# Patient Record
Sex: FEMALE | Race: WHITE | NOT HISPANIC OR LATINO | Employment: FULL TIME | URBAN - METROPOLITAN AREA
[De-identification: names, ages, dates, MRNs, and addresses within clinical notes are randomized per-mention and may not be internally consistent; named-entity substitution may affect disease eponyms.]

---

## 2023-03-31 DIAGNOSIS — E05.90 HYPERTHYROIDISM: ICD-10-CM

## 2023-03-31 RX ORDER — LEVOTHYROXINE SODIUM 75 UG/1
75 TABLET ORAL DAILY
Qty: 90 TABLET | Refills: 1 | OUTPATIENT
Start: 2023-03-31

## 2023-07-05 DIAGNOSIS — E05.90 HYPERTHYROIDISM: ICD-10-CM

## 2023-07-06 RX ORDER — LEVOTHYROXINE SODIUM 75 UG/1
75 TABLET ORAL DAILY
Qty: 90 TABLET | Refills: 0 | Status: SHIPPED | OUTPATIENT
Start: 2023-07-06 | End: 2023-07-31 | Stop reason: SDUPTHER

## 2023-07-13 PROBLEM — M81.8 ADULT IDIOPATHIC GENERALIZED OSTEOPOROSIS: Status: ACTIVE | Noted: 2023-07-13

## 2023-07-13 PROBLEM — G62.9 NEUROPATHY: Status: ACTIVE | Noted: 2023-07-13

## 2023-07-13 PROBLEM — E78.2 HYPERLIPIDEMIA, MIXED: Status: ACTIVE | Noted: 2023-07-13

## 2023-07-13 PROBLEM — K21.9 GERD (GASTROESOPHAGEAL REFLUX DISEASE): Status: ACTIVE | Noted: 2023-07-13

## 2023-07-13 PROBLEM — R76.8 ANA POSITIVE: Status: ACTIVE | Noted: 2023-07-13

## 2023-07-13 PROBLEM — E03.9 HYPOTHYROIDISM: Status: ACTIVE | Noted: 2023-07-13

## 2023-07-13 PROBLEM — M51.9 LUMBAR DISC DISEASE: Status: ACTIVE | Noted: 2023-07-13

## 2023-07-13 PROBLEM — R05.8 DRY COUGH: Status: ACTIVE | Noted: 2023-07-13

## 2023-07-13 RX ORDER — ZINC SULFATE 50(220)MG
CAPSULE ORAL
COMMUNITY

## 2023-07-13 RX ORDER — BENZONATATE 200 MG/1
CAPSULE ORAL
COMMUNITY
Start: 2022-12-05 | End: 2024-02-08 | Stop reason: ALTCHOICE

## 2023-07-13 RX ORDER — ELECTROLYTES/DEXTROSE
SOLUTION, ORAL ORAL
COMMUNITY

## 2023-07-13 RX ORDER — MECLIZINE HYDROCHLORIDE 25 MG/1
TABLET ORAL
COMMUNITY
End: 2023-07-31 | Stop reason: SDUPTHER

## 2023-07-13 RX ORDER — GLUC/MSM/COLGN2/HYAL/ANTIARTH3 375-375-20
TABLET ORAL
COMMUNITY

## 2023-07-13 RX ORDER — MULTIVITAMIN
TABLET ORAL
COMMUNITY

## 2023-07-13 RX ORDER — PRAVASTATIN SODIUM 80 MG/1
TABLET ORAL
COMMUNITY
End: 2023-07-31 | Stop reason: SDUPTHER

## 2023-07-31 ENCOUNTER — OFFICE VISIT (OUTPATIENT)
Dept: PRIMARY CARE | Facility: CLINIC | Age: 78
End: 2023-07-31
Payer: COMMERCIAL

## 2023-07-31 VITALS
WEIGHT: 160 LBS | DIASTOLIC BLOOD PRESSURE: 80 MMHG | HEIGHT: 66 IN | BODY MASS INDEX: 25.71 KG/M2 | SYSTOLIC BLOOD PRESSURE: 128 MMHG

## 2023-07-31 DIAGNOSIS — E55.9 VITAMIN D DEFICIENCY: ICD-10-CM

## 2023-07-31 DIAGNOSIS — R42 VERTIGO: ICD-10-CM

## 2023-07-31 DIAGNOSIS — Z12.31 VISIT FOR SCREENING MAMMOGRAM: ICD-10-CM

## 2023-07-31 DIAGNOSIS — E03.8 HYPOTHYROIDISM DUE TO HASHIMOTO'S THYROIDITIS: ICD-10-CM

## 2023-07-31 DIAGNOSIS — E78.2 HYPERLIPIDEMIA, MIXED: ICD-10-CM

## 2023-07-31 DIAGNOSIS — E06.3 HYPOTHYROIDISM DUE TO HASHIMOTO'S THYROIDITIS: ICD-10-CM

## 2023-07-31 DIAGNOSIS — Z00.00 ROUTINE GENERAL MEDICAL EXAMINATION AT A HEALTH CARE FACILITY: Primary | ICD-10-CM

## 2023-07-31 DIAGNOSIS — Z78.0 POSTMENOPAUSAL: ICD-10-CM

## 2023-07-31 DIAGNOSIS — E05.90 HYPERTHYROIDISM: ICD-10-CM

## 2023-07-31 PROBLEM — E61.1 IRON DEFICIENCY: Status: ACTIVE | Noted: 2023-07-31

## 2023-07-31 PROBLEM — M19.90 ARTHRITIS: Status: ACTIVE | Noted: 2023-07-31

## 2023-07-31 LAB
ALANINE AMINOTRANSFERASE (SGPT) (U/L) IN SER/PLAS: 15 U/L (ref 7–45)
ALBUMIN (G/DL) IN SER/PLAS: 4.4 G/DL (ref 3.4–5)
ALKALINE PHOSPHATASE (U/L) IN SER/PLAS: 83 U/L (ref 33–136)
ANION GAP IN SER/PLAS: 11 MMOL/L (ref 10–20)
ASPARTATE AMINOTRANSFERASE (SGOT) (U/L) IN SER/PLAS: 17 U/L (ref 9–39)
BILIRUBIN TOTAL (MG/DL) IN SER/PLAS: 0.5 MG/DL (ref 0–1.2)
CALCIDIOL (25 OH VITAMIN D3) (NG/ML) IN SER/PLAS: 18 NG/ML
CALCIUM (MG/DL) IN SER/PLAS: 9.5 MG/DL (ref 8.6–10.6)
CARBON DIOXIDE, TOTAL (MMOL/L) IN SER/PLAS: 27 MMOL/L (ref 21–32)
CHLORIDE (MMOL/L) IN SER/PLAS: 107 MMOL/L (ref 98–107)
CHOLESTEROL (MG/DL) IN SER/PLAS: 216 MG/DL (ref 0–199)
CHOLESTEROL IN HDL (MG/DL) IN SER/PLAS: 42.5 MG/DL
CHOLESTEROL/HDL RATIO: 5.1
COBALAMIN (VITAMIN B12) (PG/ML) IN SER/PLAS: 327 PG/ML (ref 211–911)
CREATININE (MG/DL) IN SER/PLAS: 0.8 MG/DL (ref 0.5–1.05)
ERYTHROCYTE DISTRIBUTION WIDTH (RATIO) BY AUTOMATED COUNT: 13.6 % (ref 11.5–14.5)
ERYTHROCYTE MEAN CORPUSCULAR HEMOGLOBIN CONCENTRATION (G/DL) BY AUTOMATED: 31 G/DL (ref 32–36)
ERYTHROCYTE MEAN CORPUSCULAR VOLUME (FL) BY AUTOMATED COUNT: 92 FL (ref 80–100)
ERYTHROCYTES (10*6/UL) IN BLOOD BY AUTOMATED COUNT: 4.58 X10E12/L (ref 4–5.2)
GFR FEMALE: 76 ML/MIN/1.73M2
GLUCOSE (MG/DL) IN SER/PLAS: 98 MG/DL (ref 74–99)
HEMATOCRIT (%) IN BLOOD BY AUTOMATED COUNT: 42.2 % (ref 36–46)
HEMOGLOBIN (G/DL) IN BLOOD: 13.1 G/DL (ref 12–16)
LDL: 149 MG/DL (ref 0–99)
LEUKOCYTES (10*3/UL) IN BLOOD BY AUTOMATED COUNT: 4.8 X10E9/L (ref 4.4–11.3)
NRBC (PER 100 WBCS) BY AUTOMATED COUNT: 0 /100 WBC (ref 0–0)
PLATELETS (10*3/UL) IN BLOOD AUTOMATED COUNT: 256 X10E9/L (ref 150–450)
POTASSIUM (MMOL/L) IN SER/PLAS: 4.1 MMOL/L (ref 3.5–5.3)
PROTEIN TOTAL: 7.6 G/DL (ref 6.4–8.2)
SODIUM (MMOL/L) IN SER/PLAS: 141 MMOL/L (ref 136–145)
THYROTROPIN (MIU/L) IN SER/PLAS BY DETECTION LIMIT <= 0.05 MIU/L: 2.17 MIU/L (ref 0.44–3.98)
THYROXINE (T4) FREE (NG/DL) IN SER/PLAS: 1.4 NG/DL (ref 0.78–1.48)
TRIGLYCERIDE (MG/DL) IN SER/PLAS: 122 MG/DL (ref 0–149)
UREA NITROGEN (MG/DL) IN SER/PLAS: 14 MG/DL (ref 6–23)
VLDL: 24 MG/DL (ref 0–40)

## 2023-07-31 PROCEDURE — 85027 COMPLETE CBC AUTOMATED: CPT

## 2023-07-31 PROCEDURE — 1126F AMNT PAIN NOTED NONE PRSNT: CPT | Performed by: FAMILY MEDICINE

## 2023-07-31 PROCEDURE — 1159F MED LIST DOCD IN RCRD: CPT | Performed by: FAMILY MEDICINE

## 2023-07-31 PROCEDURE — 82607 VITAMIN B-12: CPT

## 2023-07-31 PROCEDURE — 99397 PER PM REEVAL EST PAT 65+ YR: CPT | Performed by: FAMILY MEDICINE

## 2023-07-31 PROCEDURE — 1036F TOBACCO NON-USER: CPT | Performed by: FAMILY MEDICINE

## 2023-07-31 PROCEDURE — 84443 ASSAY THYROID STIM HORMONE: CPT

## 2023-07-31 PROCEDURE — 80061 LIPID PANEL: CPT

## 2023-07-31 PROCEDURE — 82306 VITAMIN D 25 HYDROXY: CPT

## 2023-07-31 PROCEDURE — 80053 COMPREHEN METABOLIC PANEL: CPT

## 2023-07-31 PROCEDURE — 84439 ASSAY OF FREE THYROXINE: CPT

## 2023-07-31 RX ORDER — PNEUMOCOCCAL 20-VALENT CONJUGATE VACCINE 2.2; 2.2; 2.2; 2.2; 2.2; 2.2; 2.2; 2.2; 2.2; 2.2; 2.2; 2.2; 2.2; 2.2; 2.2; 2.2; 4.4; 2.2; 2.2; 2.2 UG/.5ML; UG/.5ML; UG/.5ML; UG/.5ML; UG/.5ML; UG/.5ML; UG/.5ML; UG/.5ML; UG/.5ML; UG/.5ML; UG/.5ML; UG/.5ML; UG/.5ML; UG/.5ML; UG/.5ML; UG/.5ML; UG/.5ML; UG/.5ML; UG/.5ML; UG/.5ML
0.5 INJECTION, SUSPENSION INTRAMUSCULAR ONCE
Qty: 0.5 ML | Refills: 0 | Status: SHIPPED | OUTPATIENT
Start: 2023-07-31 | End: 2023-07-31

## 2023-07-31 RX ORDER — MECLIZINE HYDROCHLORIDE 25 MG/1
TABLET ORAL
Qty: 45 TABLET | Refills: 1 | Status: SHIPPED | OUTPATIENT
Start: 2023-07-31

## 2023-07-31 RX ORDER — PRAVASTATIN SODIUM 80 MG/1
TABLET ORAL
Qty: 90 TABLET | Refills: 3 | Status: SHIPPED | OUTPATIENT
Start: 2023-07-31

## 2023-07-31 RX ORDER — LEVOTHYROXINE SODIUM 75 UG/1
75 TABLET ORAL DAILY
Qty: 90 TABLET | Refills: 3 | Status: SHIPPED | OUTPATIENT
Start: 2023-07-31

## 2023-07-31 RX ORDER — DEXAMETHASONE SODIUM PHOSPHATE 1 MG/ML
SOLUTION/ DROPS OPHTHALMIC
COMMUNITY
Start: 2023-04-26 | End: 2023-11-16 | Stop reason: ALTCHOICE

## 2023-07-31 RX ORDER — ERGOCALCIFEROL 1.25 MG/1
1 CAPSULE ORAL
COMMUNITY
Start: 2017-01-10

## 2023-07-31 ASSESSMENT — PATIENT HEALTH QUESTIONNAIRE - PHQ9
2. FEELING DOWN, DEPRESSED OR HOPELESS: NOT AT ALL
SUM OF ALL RESPONSES TO PHQ9 QUESTIONS 1 AND 2: 0
1. LITTLE INTEREST OR PLEASURE IN DOING THINGS: NOT AT ALL

## 2023-08-01 DIAGNOSIS — E78.2 HYPERLIPIDEMIA, MIXED: ICD-10-CM

## 2023-08-01 DIAGNOSIS — E55.9 VITAMIN D DEFICIENCY: ICD-10-CM

## 2023-08-01 RX ORDER — EZETIMIBE 10 MG/1
10 TABLET ORAL DAILY
Qty: 90 TABLET | Refills: 1 | Status: SHIPPED | OUTPATIENT
Start: 2023-08-01 | End: 2024-01-28

## 2023-08-01 RX ORDER — ACETAMINOPHEN 500 MG
5000 TABLET ORAL DAILY
Qty: 90 TABLET | Refills: 1 | Status: SHIPPED | OUTPATIENT
Start: 2023-08-01

## 2023-08-06 PROBLEM — E05.90 HYPERTHYROIDISM: Status: ACTIVE | Noted: 2023-08-06

## 2023-08-06 PROBLEM — Z00.00 ROUTINE GENERAL MEDICAL EXAMINATION AT A HEALTH CARE FACILITY: Status: ACTIVE | Noted: 2023-08-06

## 2023-08-06 PROBLEM — Z78.0 POSTMENOPAUSAL: Status: ACTIVE | Noted: 2023-08-06

## 2023-08-06 PROBLEM — Z12.31 VISIT FOR SCREENING MAMMOGRAM: Status: ACTIVE | Noted: 2023-08-06

## 2023-08-06 NOTE — PROGRESS NOTES
"  Subjective     Patient ID: Denisse Butler is a 78 y.o. female who presents for Annual Exam.      Last Physical :  1 Years ago     Pt's PMH, PSH, SH, FH , meds and allergies was obtained / reviewed and updated .     Dental visits : Y  Vision issues : N  Hearing issues : N    Immunizations : Y    Diet :  could be better  Exercise:  Weight concerns :     Alcohol: as noted in SH  Tobacco: as noted in SH  Recreational drug use : None/ as noted in SH    Sexually active : Active   Contraception :   Menstrual problems:  Premenopausal/perimenopausal/ postmenopausal:    G:  Parity:  Full term:    Premature:   (s):   Living :  Ab induced:   Ab spontaneous :  Ectopic :   Multiple :    PAP smear :  Mammogram :  Colonoscopy:    Metabolic screening   - Lipid   - Glucose  ======================================================    Visit Vitals  Blood Pressure 128/80   Height 1.676 m (5' 6\")   Weight 72.6 kg (160 lb)   Body Mass Index 25.82 kg/m²   Smoking Status Never   Body Surface Area 1.84 m²      No LMP recorded.     =====================================    Review of systems:  Constitutional: no chills, no fever and no night sweats.     Eyes: no blurred vision and no eyesight problems.     ENT: no hearing loss, no nasal congestion, no nasal discharge, no hoarseness and no sore throat.     Cardiovascular: no chest pain, no intermittent leg claudication, no lower extremity edema, no palpitations and no syncope.     Respiratory: no cough, no shortness of breath during exertion, no shortness of breath at rest and no wheezing.     Gastrointestinal: no abdominal pain, no blood in stools, no constipation, no diarrhea, no melena, no nausea, no rectal pain and no vomiting.     Genitourinary: no dysuria, no change in urinary frequency, no urinary hesitancy, no feelings of urinary urgency and no vaginal discharge.     Musculoskeletal: no arthralgias, no back pain and no myalgias.     Integumentary: no new skin lesions and no " rashes.     Neurological: no difficulty walking, no headache, no limb weakness, no numbness and no tingling.     Psychiatric: no anxiety, no depression, no anhedonia and no substance use disorders.   ============================================================    Physical exam :    Constitutional: Alert and in no acute distress. Well developed, well nourished.     Eyes: Normal external exam. Pupils were equal in size, round, reactive to light (PERRL) with normal accommodation and extraocular movements intact (EOMI).     Ears, Nose, Mouth, and Throat: External inspection of ears and nose: Normal.  Otoscopic examination: Normal.      Neck: No neck mass was observed. Supple.     Cardiovascular: Heart rate and rhythm were normal, normal S1 and S2, no gallops, no murmurs and no pericardial rub    Pulmonary: No respiratory distress. Clear bilateral breath sounds.     Abdomen: Soft nontender; no abdominal mass palpated. No organomegaly.     Musculoskeletal: No joint swelling seen, normal movements of all extremities. Range of motion: Normal.  Muscle strength/tone: Normal.      Skin: Normal skin color and pigmentation, normal skin turgor, and no rash.     Neurologic: Deep tendon reflexes were 2+ and symmetric. Sensation: Normal.     Psychiatric: Judgment and insight: Intact. Mood and affect: Normal.    Lymphatic : Cervical/ axillary/ groin Lns Palpable/ non palpable     Endocrine: no recent weight gain and no recent weight loss.     Hematologic/Lymphatic: no tendency for easy bruising and no swollen glands.          All other systems have been reviewed and are negative for complaint.      Assessment/Plan    Problem List Items Addressed This Visit       Hyperlipidemia, mixed    Relevant Medications    pravastatin (Pravachol) 80 mg tablet    Other Relevant Orders    Lipid Panel (Completed)    Comprehensive Metabolic Panel (Completed)    Hypothyroidism    Relevant Orders    Thyroxine, Free (Completed)    Thyroid Stimulating  Hormone (Completed)    XR DEXA bone density    Vertigo    Relevant Medications    meclizine (Antivert) 25 mg tablet    Vitamin D deficiency    Relevant Orders    CBC (Completed)    Vitamin B12 (Completed)    Vitamin D, Total (Completed)    Visit for screening mammogram    Relevant Orders    BI mammo bilateral screening tomosynthesis    Postmenopausal    Relevant Orders    XR DEXA bone density    Routine general medical examination at a health care facility - Primary     -Nutrition: Stressed importance of moderation in sodium/caffeine intake, saturated fat and cholesterol, caloric balance, sufficient intake of fresh fruits, vegetables, fiber, ---Exercise: Stressed the importance of regular exercise.   --Injury prevention: Discussed safety belts, safety helmets, smoke detector, smoking near bedding or upholstery.   --Dental health: Discussed importance of regular tooth brushing, flossing, and dental visits.  --Immunizations reviewed.  --Discussed benefits of screening colonoscopy.  --After hours service discussed with patient           Hyperthyroidism    Relevant Medications    levothyroxine (Synthroid, Levoxyl) 75 mcg tablet

## 2023-10-20 ENCOUNTER — LAB (OUTPATIENT)
Dept: LAB | Facility: LAB | Age: 78
End: 2023-10-20
Payer: COMMERCIAL

## 2023-10-20 ENCOUNTER — OFFICE VISIT (OUTPATIENT)
Dept: NEUROLOGY | Facility: CLINIC | Age: 78
End: 2023-10-20
Payer: COMMERCIAL

## 2023-10-20 VITALS
HEART RATE: 88 BPM | TEMPERATURE: 97.1 F | DIASTOLIC BLOOD PRESSURE: 76 MMHG | SYSTOLIC BLOOD PRESSURE: 137 MMHG | HEIGHT: 64 IN | WEIGHT: 168 LBS | BODY MASS INDEX: 28.68 KG/M2

## 2023-10-20 DIAGNOSIS — G60.9 IDIOPATHIC PERIPHERAL NEUROPATHY: ICD-10-CM

## 2023-10-20 DIAGNOSIS — G60.9 IDIOPATHIC PERIPHERAL NEUROPATHY: Primary | ICD-10-CM

## 2023-10-20 PROCEDURE — 1159F MED LIST DOCD IN RCRD: CPT | Performed by: PSYCHIATRY & NEUROLOGY

## 2023-10-20 PROCEDURE — 99204 OFFICE O/P NEW MOD 45 MIN: CPT | Performed by: PSYCHIATRY & NEUROLOGY

## 2023-10-20 PROCEDURE — 1036F TOBACCO NON-USER: CPT | Performed by: PSYCHIATRY & NEUROLOGY

## 2023-10-20 PROCEDURE — 1126F AMNT PAIN NOTED NONE PRSNT: CPT | Performed by: PSYCHIATRY & NEUROLOGY

## 2023-10-20 PROCEDURE — 36415 COLL VENOUS BLD VENIPUNCTURE: CPT

## 2023-10-20 RX ORDER — NORTRIPTYLINE HYDROCHLORIDE 25 MG/1
CAPSULE ORAL
Qty: 60 CAPSULE | Refills: 3 | Status: SHIPPED | OUTPATIENT
Start: 2023-10-20 | End: 2024-03-23

## 2023-10-20 NOTE — PROGRESS NOTES
Subjective     Denisse Butler is a 78 y.o. year old, right-handed female referred by Dr. Scott ([facility]) for neuropathy    HPI  She had tingling paresthesias of the feet over the last year.  She saw Dr. Scott, who felt she had neuropathy and referred her a year ago.  She delayed coming until she developed mostly nocturnal burning dysesthesias of both feet in the last three months.  She denies other focal neurological symptoms including dysarthria, dysphagia, diplopia, focal weakness, other focal sensory change, ataxia, vertigo, or bowel/bladder incontinence, among others, in the context of the tingling.    Review of Systems   All other systems reviewed and are negative.      Patient Active Problem List   Diagnosis    Adult idiopathic generalized osteoporosis    JENNA positive    Dry cough    GERD (gastroesophageal reflux disease)    Hyperlipidemia, mixed    Hypothyroidism    Lumbar disc disease    Neuropathy    Elevated serum cholesterol    Arthritis    Iron deficiency    Vertigo    Vitamin D deficiency    Visit for screening mammogram    Postmenopausal    Routine general medical examination at a health care facility    Hyperthyroidism     Past Medical History:   Diagnosis Date    Procedure and treatment not carried out because of patient's decision for unspecified reasons 04/02/2015    Colonoscopy refused     Past Surgical History:   Procedure Laterality Date    OTHER SURGICAL HISTORY  03/04/2020    Surgery    OTHER SURGICAL HISTORY  03/04/2020    Lumpectomy     Social History     Tobacco Use    Smoking status: Never    Smokeless tobacco: Never   Substance Use Topics    Alcohol use: Never     family history includes No Known Problems in her father and mother.    Current Outpatient Medications:     benzonatate (Tessalon) 200 mg capsule, TAKE 1 CAPSULE 3 TIMES DAILY AS NEEDED., Disp: , Rfl:     biotin 5 mg capsule, Take daily, Disp: , Rfl:     cholecalciferol (Vitamin D3) 5,000 Units tablet, Take 1 tablet (5,000 Units)  "by mouth once daily., Disp: 90 tablet, Rfl: 1    dexAMETHasone (Decadron) 0.1 % ophthalmic solution, 1 drop in both eyes 4 times daily.  Taper as directed, Disp: , Rfl:     ergocalciferol (Vitamin D-2) 1.25 MG (75315 UT) capsule, 1 capsule (1,250 mcg)., Disp: , Rfl:     ezetimibe (Zetia) 10 mg tablet, Take 1 tablet (10 mg) by mouth once daily., Disp: 90 tablet, Rfl: 1    ferrous gluconate 236 mg (27 mg iron) tablet, Take daily, Disp: , Rfl:     levothyroxine (Synthroid, Levoxyl) 75 mcg tablet, Take 1 tablet (75 mcg) by mouth once daily., Disp: 90 tablet, Rfl: 3    meclizine (Antivert) 25 mg tablet, TAKE 1 TABLET 3 TIMES DAILY AS NEEDED., Disp: 45 tablet, Rfl: 1    multivitamin (Daily Multi-Vitamin) tablet, Take daily, Disp: , Rfl:     pravastatin (Pravachol) 80 mg tablet, TAKE 1 TABLET EVERY DAY (STOP SIMVASTATIN), Disp: 90 tablet, Rfl: 3    zinc sulfate (Zincate) 220 (50 Zn) MG capsule, Take daily, Disp: , Rfl:   Allergies   Allergen Reactions    Oxycodone-Acetaminophen Unknown    Prednisone Unknown       Objective     /76   Pulse 88   Temp 36.2 °C (97.1 °F)   Ht 1.626 m (5' 4\")   Wt 76.2 kg (168 lb)   BMI 28.84 kg/m²     CONSTITUTIONAL:  No acute distress    CARDIOVASCULAR:  Normal pulses in the distal legs, no edema of either arm or either leg.  No carotid bruits.    MENTAL STATUS:  Awake, alert, fully oriented to self, place, and time, with present short-term memory, good awareness of recent events, normal attention span, concentration, and fund of knowledge.    SPEECH AND LANGUAGE:  Can name and repeat, follows all commands, has no dysarthria    FUNDOSCOPIC:  No papilledema    CRANIAL NERVES:  II-Vision present, visual fields full to confrontational testing    III/IV/VI--EOMs are present in all directions.  Pupils are symmetrically reactive in dim light.  No ptosis.    V--Normal facial sensation.    VII--No facial asymmetry.    VIII--Hearing present to voice bilaterally.    IX/X--Symmetric soft " palate rise.    XI--Normal trapezius power bilaterally.    XII--Tongue protrudes without deviation.    MOTOR:  Normal power, tone, and bulk in both arms and both legs.    SENSORY:  Reduced pin sensation in a stocking-glove distribution from the feet to the upper shins and from the fingers to the distal hand.  No asymmetry or spinal sensory level.    COORDINATION:  Normal finger-to-nose and heel-to-shin testing in both arms and both legs.    REFLEXES are normal and symmetric at the biceps, triceps, brachioradialis, patella, and ankle.  The plantar responses are flexor.    GAIT is normal, without steppage, ataxia, shuffling, or spasticity.      Assessment/Plan   Diagnoses and all orders for this visit:  Idiopathic peripheral neuropathy  -     Heavy Metals, Blood; Future  -     nortriptyline (Pamelor) 25 mg capsule; 1 by mouth at bedtime for two weeks, then 2 at bedtime    IMPRESSION:  Mild sensory peripheral neuropathy.    PLAN:  She has already had a lab workup for neuropathy that was negative other than positive JENNA.  I added a heavy metal screen.  In the meantime, I started her on nortriptyline titration to 50 mg at bedtime for prophylaxis of the burning.  I will see her again in three months or prn.    Clarke Alatorre Jr., M.D., FAAN

## 2023-10-25 ENCOUNTER — TELEPHONE (OUTPATIENT)
Dept: NEUROLOGY | Facility: CLINIC | Age: 78
End: 2023-10-25

## 2023-10-25 ENCOUNTER — LAB (OUTPATIENT)
Dept: LAB | Facility: LAB | Age: 78
End: 2023-10-25
Payer: COMMERCIAL

## 2023-10-25 DIAGNOSIS — G62.9 NEUROPATHY: Primary | ICD-10-CM

## 2023-10-25 NOTE — TELEPHONE ENCOUNTER
please re enter lab orders, pt went to lab today to have redrawn and order wasnt entered - thank you

## 2023-10-26 ENCOUNTER — LAB (OUTPATIENT)
Dept: LAB | Facility: LAB | Age: 78
End: 2023-10-26
Payer: COMMERCIAL

## 2023-10-26 DIAGNOSIS — G62.9 NEUROPATHY: ICD-10-CM

## 2023-10-26 PROCEDURE — 83825 ASSAY OF MERCURY: CPT

## 2023-10-26 PROCEDURE — 36415 COLL VENOUS BLD VENIPUNCTURE: CPT

## 2023-10-30 ENCOUNTER — PATIENT MESSAGE (OUTPATIENT)
Dept: NEUROLOGY | Facility: CLINIC | Age: 78
End: 2023-10-30
Payer: COMMERCIAL

## 2023-10-30 LAB
ARSENIC BLD-MCNC: <10 UG/L
LEAD BLDV-MCNC: <2 UG/DL
MERCURY BLD-MCNC: <2.5 UG/L

## 2023-11-06 ENCOUNTER — ANCILLARY PROCEDURE (OUTPATIENT)
Dept: RADIOLOGY | Facility: CLINIC | Age: 78
End: 2023-11-06
Payer: COMMERCIAL

## 2023-11-06 DIAGNOSIS — E03.8 OTHER SPECIFIED HYPOTHYROIDISM: ICD-10-CM

## 2023-11-06 DIAGNOSIS — Z78.0 ASYMPTOMATIC MENOPAUSAL STATE: ICD-10-CM

## 2023-11-06 DIAGNOSIS — E06.3 AUTOIMMUNE THYROIDITIS: ICD-10-CM

## 2023-11-06 PROCEDURE — 77080 DXA BONE DENSITY AXIAL: CPT

## 2023-11-06 PROCEDURE — 77080 DXA BONE DENSITY AXIAL: CPT | Performed by: RADIOLOGY

## 2023-11-16 ENCOUNTER — OFFICE VISIT (OUTPATIENT)
Dept: PRIMARY CARE | Facility: CLINIC | Age: 78
End: 2023-11-16
Payer: COMMERCIAL

## 2023-11-16 VITALS
SYSTOLIC BLOOD PRESSURE: 143 MMHG | HEIGHT: 64 IN | BODY MASS INDEX: 27.31 KG/M2 | WEIGHT: 160 LBS | DIASTOLIC BLOOD PRESSURE: 84 MMHG | HEART RATE: 96 BPM

## 2023-11-16 DIAGNOSIS — J01.00 ACUTE NON-RECURRENT MAXILLARY SINUSITIS: Primary | ICD-10-CM

## 2023-11-16 DIAGNOSIS — G62.9 NEUROPATHY: ICD-10-CM

## 2023-11-16 PROCEDURE — 99214 OFFICE O/P EST MOD 30 MIN: CPT | Performed by: FAMILY MEDICINE

## 2023-11-16 PROCEDURE — 1126F AMNT PAIN NOTED NONE PRSNT: CPT | Performed by: FAMILY MEDICINE

## 2023-11-16 PROCEDURE — 1159F MED LIST DOCD IN RCRD: CPT | Performed by: FAMILY MEDICINE

## 2023-11-16 PROCEDURE — 1036F TOBACCO NON-USER: CPT | Performed by: FAMILY MEDICINE

## 2023-11-16 RX ORDER — AMOXICILLIN AND CLAVULANATE POTASSIUM 875; 125 MG/1; MG/1
875 TABLET, FILM COATED ORAL 2 TIMES DAILY
Qty: 14 TABLET | Refills: 0 | Status: SHIPPED | OUTPATIENT
Start: 2023-11-16 | End: 2023-11-23

## 2023-11-16 ASSESSMENT — ENCOUNTER SYMPTOMS
DEPRESSION: 0
LOSS OF SENSATION IN FEET: 0
OCCASIONAL FEELINGS OF UNSTEADINESS: 0

## 2023-11-16 ASSESSMENT — PATIENT HEALTH QUESTIONNAIRE - PHQ9
1. LITTLE INTEREST OR PLEASURE IN DOING THINGS: NOT AT ALL
2. FEELING DOWN, DEPRESSED OR HOPELESS: NOT AT ALL
SUM OF ALL RESPONSES TO PHQ9 QUESTIONS 1 AND 2: 0

## 2023-11-18 PROBLEM — J01.00 ACUTE NON-RECURRENT MAXILLARY SINUSITIS: Status: ACTIVE | Noted: 2023-11-18

## 2023-11-18 ASSESSMENT — ENCOUNTER SYMPTOMS
ARTHRALGIAS: 1
NUMBNESS: 1
SORE THROAT: 1
GASTROINTESTINAL NEGATIVE: 1
COUGH: 1
SINUS PRESSURE: 1
SINUS PAIN: 1
EYES NEGATIVE: 1
CONSTITUTIONAL NEGATIVE: 1
FACIAL SWELLING: 1

## 2023-11-18 NOTE — PROGRESS NOTES
Subjective   Patient ID: Denisse Butler is a 78 y.o. female who presents for Cough.      Cough  Associated symptoms include a sore throat.    patient is here for cough congestion postnasal drainage and facial pressure denies shortness of breath taking Synthroid as prescribed saw neurology was given amitriptyline has been taking 50 mg but has a very dry mouth  Current Outpatient Medications on File Prior to Visit   Medication Sig Dispense Refill    benzonatate (Tessalon) 200 mg capsule TAKE 1 CAPSULE 3 TIMES DAILY AS NEEDED.      biotin 5 mg capsule Take daily      cholecalciferol (Vitamin D3) 5,000 Units tablet Take 1 tablet (5,000 Units) by mouth once daily. 90 tablet 1    ergocalciferol (Vitamin D-2) 1.25 MG (89829 UT) capsule 1 capsule (1,250 mcg).      ezetimibe (Zetia) 10 mg tablet Take 1 tablet (10 mg) by mouth once daily. 90 tablet 1    ferrous gluconate 236 mg (27 mg iron) tablet Take daily      levothyroxine (Synthroid, Levoxyl) 75 mcg tablet Take 1 tablet (75 mcg) by mouth once daily. 90 tablet 3    meclizine (Antivert) 25 mg tablet TAKE 1 TABLET 3 TIMES DAILY AS NEEDED. 45 tablet 1    multivitamin (Daily Multi-Vitamin) tablet Take daily      nortriptyline (Pamelor) 25 mg capsule 1 by mouth at bedtime for two weeks, then 2 at bedtime 60 capsule 3    pravastatin (Pravachol) 80 mg tablet TAKE 1 TABLET EVERY DAY (STOP SIMVASTATIN) 90 tablet 3    zinc sulfate (Zincate) 220 (50 Zn) MG capsule Take daily      [DISCONTINUED] dexAMETHasone (Decadron) 0.1 % ophthalmic solution 1 drop in both eyes 4 times daily.  Taper as directed       No current facility-administered medications on file prior to visit.        Review of Systems   Constitutional: Negative.    HENT:  Positive for congestion, facial swelling, sinus pressure, sinus pain and sore throat.    Eyes: Negative.    Respiratory:  Positive for cough.    Gastrointestinal: Negative.    Musculoskeletal:  Positive for arthralgias.   Neurological:  Positive for  "numbness.       Objective   Blood Pressure 143/84   Pulse 96   Height 1.626 m (5' 4\")   Weight 72.6 kg (160 lb)   Body Mass Index 27.46 kg/m²   BSA: 1.81 meters squared  Growth percentiles: Facility age limit for growth %catrachito is 20 years. Facility age limit for growth %catrachito is 20 years.   No visits with results within 1 Week(s) from this visit.   Latest known visit with results is:   Lab on 10/26/2023   Component Date Value Ref Range Status    Arsenic 10/26/2023 <10.0  <=12.0 ug/L Final    INTERPRETIVE INFORMATION: Arsenic, Blood    Elevated results may be due to skin or collection-related   contamination, including the use of a noncertified metal-free   collection/transport tube. If contamination concerns exist due to   elevated levels of blood arsenic, confirmation with a second   specimen collected in a certified metal-free tube is recommended.     Potentially toxic ranges for blood arsenic: Greater than or equal   to 600 ug/L.    Blood arsenic is for the detection of recent exposure poisoning   only. Blood arsenic levels in healthy subjects vary considerably   with exposure to arsenic in the diet and the environment. A   24-hour urine arsenic is useful for the detection of chronic   exposure.     This test was developed and its performance characteristics   determined by [facility]. It has not been cleared or   approved by the [place] Food and Drug Administration. This test was   performed in a CLIA certified laboratory and is intended for   clinical purposes.    Mercury 10/26/2023 <2.5  <=10.0 ug/L Final    Comment: INTERPRETIVE INFORMATION: Mercury, Blood    Elevated results may be due to skin or collection-related   contamination, including the use of a noncertified metal-free   collection/transport tube. If contamination concerns exist due to   elevated levels of blood mercury, confirmation with a second   specimen collected in a certified metal-free tube is recommended.    Blood mercury levels " "predominantly reflect recent exposure and are   most useful in the diagnosis of acute poisoning as blood mercury   concentrations rise sharply and fall quickly over several days   after ingestion. Blood concentrations in unexposed individuals   rarely exceed 20 ug/L. The provided reference interval relates to   inorganic mercury concentrations. Dietary and non-occupational   exposure to organic mercury forms may contribute to an elevated   total mercury result. Clinical presentation after toxic exposure   to organic mercury may include dysarthria, ataxia and constricted   vision fields with mercury blood                            concentrations from 20 to 50   ug/L.     This test was developed and its performance characteristics   determined by [facility]. It has not been cleared or   approved by the [place] Food and Drug Administration. This test was   performed in a CLIA certified laboratory and is intended for   clinical purposes.  Performed By: [facility]  [address] [place] [address]  : Sergio Martinez MD, PhD  IA Number: [id number]    Lead, Blood 10/26/2023 <2.0  <=4.9 ug/dL Final    Comment: INTERPRETIVE INFORMATION: Lead, Blood (Venous)    Analysis performed by Inductively Coupled Plasma-Mass Spectrometry   (ICP-MS).    Elevated results may be due to skin or collection-related   contamination, including the use of a noncertified lead-free tube.   If contamination concerns exist due to elevated levels of blood   lead, confirmation with a second specimen collected in a certified   lead-free tube is recommended.    Information sources for blood lead reference intervals and   interpretive comments include the CDC's \"Childhood Lead Poisoning   Prevention: Recommended Actions Based on Blood Lead Level\" and the   \"Adult Blood Lead Epidemiology and Surveillance: Reference Blood   Lead Levels (BLLs) for Adults in the [place] Thresholds and time   intervals for retesting, " medical evaluation, and response vary by   state and regulatory body. Contact your State Department of Health   and/or applicable regulatory agency for specific guidance on   medical management                            recommendations.    This test was developed and its performance characteristics   determined by [facility]. It has not been cleared or   approved by the [place] Food and Drug Administration. This test was   performed in a CLIA-certified laboratory and is intended for   clinical purposes.         Group          Concentration   Comment    Children       3.5-19.9 ug/dL  Children under the age of 6                                 years are the most vulnerable                                 to the harmful effects of                                  lead exposure. Environmental                                  investigation and exposure                                  history to identify potential                                 sources of lead. Biological                                  and nutritional monitoring                                 are recommended. Follow-up                                  blood lead monitoring is                                  recommended.                                                           20-44.9 ug/dL   Lead hazard reduction and                                  prompt medical evaluation are                                 recommended. Contact a                                  Pediatric Environmental                                  Health Specialty Unit or                                  poison control center for                                  guidance.                   Greater than    Critical. Immediate medical                  44.9 ug/dL      evaluation, including                                  detailed neurological exam is                                 recommended. Consider                                  chelation therapy when                                  symptoms of lead toxicity are                                  present. Contact a Pediatric                                  Environmental Health                                  Specialty Unit or poison                                  control center for                                                             assistance.    Adult          5-19.9 ug/dL    Medical removal is                                  recommended for pregnant                                  women or those who are trying                                 or may become pregnant.                                  Adverse health effects are                                  possible. Reduced lead                                  exposure and increased blood                                  lead monitoring are                                  recommended.                    20-69.9 ug/dL   Adverse health effects are                                  indicated. Medical removal                                  from lead exposure is                                  required by OSHA if blood                                  lead level exceeds 50 ug/dL.                                 Prompt medical evaluation is                                 recommended.                    Greater than    Critical. Immediate medical                                             69.9 ug/dL      evaluation is recommended.                                  Consider chelation therapy                                 when symptoms of lead                                  toxicity are present.      Physical Exam  Vitals and nursing note reviewed.   Constitutional:       Appearance: Normal appearance.   HENT:      Head: Normocephalic and atraumatic.      Right Ear: Tympanic membrane normal.      Left Ear: Tympanic membrane normal.      Nose: Congestion and rhinorrhea present.      Mouth/Throat:      Mouth: Mucous membranes are moist.      Pharynx: Oropharyngeal  exudate present.   Eyes:      Pupils: Pupils are equal, round, and reactive to light.   Cardiovascular:      Rate and Rhythm: Normal rate and regular rhythm.      Pulses: Normal pulses.      Heart sounds: Normal heart sounds.   Pulmonary:      Effort: Pulmonary effort is normal.      Breath sounds: Normal breath sounds.   Abdominal:      General: Abdomen is flat. Bowel sounds are normal.      Palpations: Abdomen is soft.   Musculoskeletal:         General: Normal range of motion.      Cervical back: Normal range of motion and neck supple.   Skin:     General: Skin is warm and dry.      Capillary Refill: Capillary refill takes less than 2 seconds.   Neurological:      General: No focal deficit present.      Mental Status: She is alert and oriented to person, place, and time.   Psychiatric:         Mood and Affect: Mood normal.         Assessment/Plan   Problem List Items Addressed This Visit             ICD-10-CM    Neuropathy G62.9     Will call neurology         Acute non-recurrent maxillary sinusitis - Primary J01.00    Relevant Medications    amoxicillin-pot clavulanate (Augmentin) 875-125 mg tablet

## 2023-11-21 ENCOUNTER — APPOINTMENT (OUTPATIENT)
Dept: NEUROLOGY | Facility: CLINIC | Age: 78
End: 2023-11-21
Payer: COMMERCIAL

## 2023-12-29 ENCOUNTER — CLINICAL SUPPORT (OUTPATIENT)
Dept: PRIMARY CARE | Facility: CLINIC | Age: 78
End: 2023-12-29
Payer: COMMERCIAL

## 2023-12-29 DIAGNOSIS — E78.2 HYPERLIPIDEMIA, MIXED: ICD-10-CM

## 2023-12-29 DIAGNOSIS — E55.9 VITAMIN D DEFICIENCY: ICD-10-CM

## 2023-12-29 LAB
25(OH)D3 SERPL-MCNC: 40 NG/ML (ref 30–100)
CHOLEST SERPL-MCNC: 192 MG/DL (ref 0–199)
CHOLESTEROL/HDL RATIO: 4.8
HDLC SERPL-MCNC: 39.8 MG/DL
LDLC SERPL CALC-MCNC: 113 MG/DL
NON HDL CHOLESTEROL: 152 MG/DL (ref 0–149)
TRIGL SERPL-MCNC: 196 MG/DL (ref 0–149)
VLDL: 39 MG/DL (ref 0–40)

## 2023-12-29 PROCEDURE — 36415 COLL VENOUS BLD VENIPUNCTURE: CPT

## 2023-12-29 PROCEDURE — 80061 LIPID PANEL: CPT

## 2023-12-29 PROCEDURE — 82306 VITAMIN D 25 HYDROXY: CPT

## 2024-01-22 ENCOUNTER — OFFICE VISIT (OUTPATIENT)
Dept: NEUROLOGY | Facility: CLINIC | Age: 79
End: 2024-01-22
Payer: COMMERCIAL

## 2024-01-22 VITALS
DIASTOLIC BLOOD PRESSURE: 82 MMHG | SYSTOLIC BLOOD PRESSURE: 128 MMHG | HEART RATE: 102 BPM | TEMPERATURE: 96.9 F | HEIGHT: 65 IN | BODY MASS INDEX: 27.16 KG/M2 | WEIGHT: 163 LBS

## 2024-01-22 DIAGNOSIS — G60.9 IDIOPATHIC PERIPHERAL NEUROPATHY: Primary | ICD-10-CM

## 2024-01-22 PROCEDURE — 99213 OFFICE O/P EST LOW 20 MIN: CPT | Performed by: PSYCHIATRY & NEUROLOGY

## 2024-01-22 PROCEDURE — 1126F AMNT PAIN NOTED NONE PRSNT: CPT | Performed by: PSYCHIATRY & NEUROLOGY

## 2024-01-22 PROCEDURE — 1159F MED LIST DOCD IN RCRD: CPT | Performed by: PSYCHIATRY & NEUROLOGY

## 2024-01-22 PROCEDURE — 1036F TOBACCO NON-USER: CPT | Performed by: PSYCHIATRY & NEUROLOGY

## 2024-01-22 NOTE — PROGRESS NOTES
NEUROLOGY OUTPATIENT FOLLOW-UP NOTE    Assessment/Plan   Diagnoses and all orders for this visit:  Idiopathic peripheral neuropathy      IMPRESSION:  Mild sensory peripheral neuropathy    PLAN:  To reduce nortriptyline to 25 mg at bedtime to reduce xerostomia.  I will see her in 3-4 months or prn.      Clarke Alatorre Jr., M.D., [facility]   ----------    Subjective     Denisse Butler is a 78 y.o. year old female here for follow-up.    She reports that nortriptyline 50 mg at bedtime helps the foot burning.  She notes some tingling upon first arising in the morning, but it improves.  However, she has severe xerostomia with this dose.  She denies other focal neurological symptoms including dysarthria, dysphagia, diplopia, focal weakness, focal sensory change, ataxia, vertigo, or bowel/bladder incontinence, among others.    Patient Active Problem List   Diagnosis    Adult idiopathic generalized osteoporosis    JENNA positive    Dry cough    GERD (gastroesophageal reflux disease)    Hyperlipidemia, mixed    Hypothyroidism    Lumbar disc disease    Neuropathy    Elevated serum cholesterol    Arthritis    Iron deficiency    Vertigo    Vitamin D deficiency    Visit for screening mammogram    Postmenopausal    Routine general medical examination at a health care facility    Hyperthyroidism    Acute non-recurrent maxillary sinusitis     Past Medical History:   Diagnosis Date    Procedure and treatment not carried out because of patient's decision for unspecified reasons 04/02/2015    Colonoscopy refused     Past Surgical History:   Procedure Laterality Date    OTHER SURGICAL HISTORY  03/04/2020    Surgery    OTHER SURGICAL HISTORY  03/04/2020    Lumpectomy     Social History     Tobacco Use    Smoking status: Never    Smokeless tobacco: Never   Substance Use Topics    Alcohol use: Never     family history includes No Known Problems in her father and mother.    Current Outpatient Medications:     benzonatate (Tessalon) 200  "mg capsule, TAKE 1 CAPSULE 3 TIMES DAILY AS NEEDED., Disp: , Rfl:     biotin 5 mg capsule, Take daily, Disp: , Rfl:     cholecalciferol (Vitamin D3) 5,000 Units tablet, Take 1 tablet (5,000 Units) by mouth once daily., Disp: 90 tablet, Rfl: 1    ergocalciferol (Vitamin D-2) 1.25 MG (15151 UT) capsule, 1 capsule (1,250 mcg)., Disp: , Rfl:     ezetimibe (Zetia) 10 mg tablet, Take 1 tablet (10 mg) by mouth once daily., Disp: 90 tablet, Rfl: 1    ferrous gluconate 236 mg (27 mg iron) tablet, Take daily, Disp: , Rfl:     levothyroxine (Synthroid, Levoxyl) 75 mcg tablet, Take 1 tablet (75 mcg) by mouth once daily., Disp: 90 tablet, Rfl: 3    meclizine (Antivert) 25 mg tablet, TAKE 1 TABLET 3 TIMES DAILY AS NEEDED., Disp: 45 tablet, Rfl: 1    multivitamin (Daily Multi-Vitamin) tablet, Take daily, Disp: , Rfl:     nortriptyline (Pamelor) 25 mg capsule, 1 by mouth at bedtime for two weeks, then 2 at bedtime, Disp: 60 capsule, Rfl: 3    pravastatin (Pravachol) 80 mg tablet, TAKE 1 TABLET EVERY DAY (STOP SIMVASTATIN), Disp: 90 tablet, Rfl: 3    zinc sulfate (Zincate) 220 (50 Zn) MG capsule, Take daily, Disp: , Rfl:   Allergies   Allergen Reactions    Oxycodone-Acetaminophen Unknown    Prednisone Unknown       Objective     /82   Pulse 102   Temp 36.1 °C (96.9 °F)   Ht 1.651 m (5' 5\")   Wt 73.9 kg (163 lb)   BMI 27.12 kg/m²     CONSTITUTIONAL:  No acute distress    MENTAL STATUS:  Awake, alert, fully oriented to self, place, and time, with present short-term memory, good awareness of recent events, normal attention span, concentration, and fund of knowledge.    SPEECH AND LANGUAGE:  Can name and repeat, follows all commands, has no dysarthria    CRANIAL NERVES:  II-Vision present, visual fields full to confrontational testing    III/IV/VI--EOMs are present in all directions.  Pupils are symmetrically reactive in dim light.  No ptosis.    V--Normal facial sensation.    VII--No facial asymmetry.    VIII--Hearing " present to voice bilaterally.    IX/X--Symmetric soft palate rise.    XI--Normal trapezius power bilaterally.    XII--Tongue protrudes without deviation.    MOTOR:  Normal power, tone, and bulk in both arms and both legs.    SENSORY:  Reduced pin sensation in a stocking-glove distribution from the feet to the upper shins and from the fingers to the distal hand.  No asymmetry or spinal sensory level.     COORDINATION:  Normal finger-to-nose and heel-to-shin testing in both arms and both legs.    REFLEXES are normal and symmetric at the biceps, triceps, brachioradialis, patella, and ankle.  The plantar responses are flexor.    GAIT is normal, without steppage, ataxia, shuffling, or spasticity.      Clarke Alatorre Jr., M.D., [facility]

## 2024-01-22 NOTE — LETTER
January 22, 2024     David Smith MD  [address]  [facility] [address]  [facility] [address]    Patient: Denisse Butler   YOB: 1945   Date of Visit: 1/22/2024       Dear Dr. David Smith MD:    Thank you for allowing me to continue in the neurological care of your patient, Denisse Butler. Below are my notes for this visit.  If you have questions, please do not hesitate to call me. I look forward to following your patient along with you.       Sincerely,     Clarke Alatorre Jr., M.D., [facility]       CC: No Recipients  ______________________________________________________________________________________    NEUROLOGY OUTPATIENT FOLLOW-UP NOTE    Assessment/Plan  Diagnoses and all orders for this visit:  Idiopathic peripheral neuropathy      IMPRESSION:  Mild sensory peripheral neuropathy    PLAN:  To reduce nortriptyline to 25 mg at bedtime to reduce xerostomia.  I will see her in 3-4 months or prn.      Clarke Alatorre Jr., M.D., [facility]   ----------    Subjective    Denisse Butler is a 78 y.o. year old female here for follow-up.    She reports that nortriptyline 50 mg at bedtime helps the foot burning.  She notes some tingling upon first arising in the morning, but it improves.  However, she has severe xerostomia with this dose.  She denies other focal neurological symptoms including dysarthria, dysphagia, diplopia, focal weakness, focal sensory change, ataxia, vertigo, or bowel/bladder incontinence, among others.    Patient Active Problem List   Diagnosis   • Adult idiopathic generalized osteoporosis   • JENNA positive   • Dry cough   • GERD (gastroesophageal reflux disease)   • Hyperlipidemia, mixed   • Hypothyroidism   • Lumbar disc disease   • Neuropathy   • Elevated serum cholesterol   • Arthritis   • Iron deficiency   • Vertigo   • Vitamin D deficiency   • Visit for screening mammogram   • Postmenopausal   • Routine general medical examination at a health care facility   • Hyperthyroidism   •  Acute non-recurrent maxillary sinusitis     Past Medical History:   Diagnosis Date   • Procedure and treatment not carried out because of patient's decision for unspecified reasons 04/02/2015    Colonoscopy refused     Past Surgical History:   Procedure Laterality Date   • OTHER SURGICAL HISTORY  03/04/2020    Surgery   • OTHER SURGICAL HISTORY  03/04/2020    Lumpectomy     Social History     Tobacco Use   • Smoking status: Never   • Smokeless tobacco: Never   Substance Use Topics   • Alcohol use: Never     family history includes No Known Problems in her father and mother.    Current Outpatient Medications:   •  benzonatate (Tessalon) 200 mg capsule, TAKE 1 CAPSULE 3 TIMES DAILY AS NEEDED., Disp: , Rfl:   •  biotin 5 mg capsule, Take daily, Disp: , Rfl:   •  cholecalciferol (Vitamin D3) 5,000 Units tablet, Take 1 tablet (5,000 Units) by mouth once daily., Disp: 90 tablet, Rfl: 1  •  ergocalciferol (Vitamin D-2) 1.25 MG (14884 UT) capsule, 1 capsule (1,250 mcg)., Disp: , Rfl:   •  ezetimibe (Zetia) 10 mg tablet, Take 1 tablet (10 mg) by mouth once daily., Disp: 90 tablet, Rfl: 1  •  ferrous gluconate 236 mg (27 mg iron) tablet, Take daily, Disp: , Rfl:   •  levothyroxine (Synthroid, Levoxyl) 75 mcg tablet, Take 1 tablet (75 mcg) by mouth once daily., Disp: 90 tablet, Rfl: 3  •  meclizine (Antivert) 25 mg tablet, TAKE 1 TABLET 3 TIMES DAILY AS NEEDED., Disp: 45 tablet, Rfl: 1  •  multivitamin (Daily Multi-Vitamin) tablet, Take daily, Disp: , Rfl:   •  nortriptyline (Pamelor) 25 mg capsule, 1 by mouth at bedtime for two weeks, then 2 at bedtime, Disp: 60 capsule, Rfl: 3  •  pravastatin (Pravachol) 80 mg tablet, TAKE 1 TABLET EVERY DAY (STOP SIMVASTATIN), Disp: 90 tablet, Rfl: 3  •  zinc sulfate (Zincate) 220 (50 Zn) MG capsule, Take daily, Disp: , Rfl:   Allergies   Allergen Reactions   • Oxycodone-Acetaminophen Unknown   • Prednisone Unknown       Objective    /82   Pulse 102   Temp 36.1 °C (96.9 °F)   Ht  "1.651 m (5' 5\")   Wt 73.9 kg (163 lb)   BMI 27.12 kg/m²     CONSTITUTIONAL:  No acute distress    MENTAL STATUS:  Awake, alert, fully oriented to self, place, and time, with present short-term memory, good awareness of recent events, normal attention span, concentration, and fund of knowledge.    SPEECH AND LANGUAGE:  Can name and repeat, follows all commands, has no dysarthria    CRANIAL NERVES:  II-Vision present, visual fields full to confrontational testing    III/IV/VI--EOMs are present in all directions.  Pupils are symmetrically reactive in dim light.  No ptosis.    V--Normal facial sensation.    VII--No facial asymmetry.    VIII--Hearing present to voice bilaterally.    IX/X--Symmetric soft palate rise.    XI--Normal trapezius power bilaterally.    XII--Tongue protrudes without deviation.    MOTOR:  Normal power, tone, and bulk in both arms and both legs.    SENSORY:  Reduced pin sensation in a stocking-glove distribution from the feet to the upper shins and from the fingers to the distal hand.  No asymmetry or spinal sensory level.     COORDINATION:  Normal finger-to-nose and heel-to-shin testing in both arms and both legs.    REFLEXES are normal and symmetric at the biceps, triceps, brachioradialis, patella, and ankle.  The plantar responses are flexor.    GAIT is normal, without steppage, ataxia, shuffling, or spasticity.      Clarke Alatorre Jr., M.D., [facility]AN     "

## 2024-02-07 ENCOUNTER — TELEPHONE (OUTPATIENT)
Dept: PRIMARY CARE | Facility: CLINIC | Age: 79
End: 2024-02-07
Payer: COMMERCIAL

## 2024-02-08 ENCOUNTER — TELEMEDICINE (OUTPATIENT)
Dept: PRIMARY CARE | Facility: CLINIC | Age: 79
End: 2024-02-08
Payer: COMMERCIAL

## 2024-02-08 VITALS — WEIGHT: 157 LBS | HEIGHT: 64 IN | BODY MASS INDEX: 26.8 KG/M2 | TEMPERATURE: 99.5 F

## 2024-02-08 DIAGNOSIS — E78.2 HYPERLIPIDEMIA, MIXED: ICD-10-CM

## 2024-02-08 DIAGNOSIS — U07.1 COVID-19: Primary | ICD-10-CM

## 2024-02-08 PROCEDURE — 1036F TOBACCO NON-USER: CPT | Performed by: FAMILY MEDICINE

## 2024-02-08 PROCEDURE — 1159F MED LIST DOCD IN RCRD: CPT | Performed by: FAMILY MEDICINE

## 2024-02-08 PROCEDURE — 99213 OFFICE O/P EST LOW 20 MIN: CPT | Performed by: FAMILY MEDICINE

## 2024-02-08 PROCEDURE — 1126F AMNT PAIN NOTED NONE PRSNT: CPT | Performed by: FAMILY MEDICINE

## 2024-02-08 RX ORDER — NIRMATRELVIR AND RITONAVIR 300-100 MG
3 KIT ORAL 2 TIMES DAILY
Qty: 30 TABLET | Refills: 0 | Status: SHIPPED | OUTPATIENT
Start: 2024-02-08 | End: 2024-02-13

## 2024-02-10 PROBLEM — U07.1 COVID-19: Status: ACTIVE | Noted: 2024-02-10

## 2024-02-10 ASSESSMENT — ENCOUNTER SYMPTOMS
SHORTNESS OF BREATH: 0
MYALGIAS: 1
COUGH: 1
SINUS PRESSURE: 1
HEADACHES: 1
APPETITE CHANGE: 1
FEVER: 1

## 2024-02-10 NOTE — PROGRESS NOTES
"Subjective   Patient ID: Denisse Butler is a 78 y.o. female who presents for Covid-19 Testing (Positive ), Fever, Nasal Congestion, Headache, and Generalized Body Aches.      Fever   Associated symptoms include congestion, coughing and headaches.   Headache   Associated symptoms include coughing, a fever and sinus pressure.     No SOB   Taking statin    Current Outpatient Medications on File Prior to Visit   Medication Sig Dispense Refill    biotin 5 mg capsule Take daily      cholecalciferol (Vitamin D3) 5,000 Units tablet Take 1 tablet (5,000 Units) by mouth once daily. 90 tablet 1    ergocalciferol (Vitamin D-2) 1.25 MG (84377 UT) capsule 1 capsule (1,250 mcg).      ferrous gluconate 236 mg (27 mg iron) tablet Take daily      levothyroxine (Synthroid, Levoxyl) 75 mcg tablet Take 1 tablet (75 mcg) by mouth once daily. 90 tablet 3    meclizine (Antivert) 25 mg tablet TAKE 1 TABLET 3 TIMES DAILY AS NEEDED. 45 tablet 1    multivitamin (Daily Multi-Vitamin) tablet Take daily      nortriptyline (Pamelor) 25 mg capsule 1 by mouth at bedtime for two weeks, then 2 at bedtime 60 capsule 3    pravastatin (Pravachol) 80 mg tablet TAKE 1 TABLET EVERY DAY (STOP SIMVASTATIN) 90 tablet 3    zinc sulfate (Zincate) 220 (50 Zn) MG capsule Take daily      ezetimibe (Zetia) 10 mg tablet Take 1 tablet (10 mg) by mouth once daily. 90 tablet 1    [DISCONTINUED] benzonatate (Tessalon) 200 mg capsule TAKE 1 CAPSULE 3 TIMES DAILY AS NEEDED.       No current facility-administered medications on file prior to visit.        Review of Systems   Constitutional:  Positive for appetite change and fever.   HENT:  Positive for congestion and sinus pressure.    Respiratory:  Positive for cough. Negative for shortness of breath.    Musculoskeletal:  Positive for myalgias.   Neurological:  Positive for headaches.       Objective   Temperature 37.5 °C (99.5 °F)   Height 1.626 m (5' 4\")   Weight 71.2 kg (157 lb)   Body Mass Index 26.95 kg/m² "   BSA: 1.79 meters squared  Growth percentiles: Facility age limit for growth %catrachito is 20 years. Facility age limit for growth %catrachito is 20 years.   No visits with results within 1 Week(s) from this visit.   Latest known visit with results is:   Clinical Support on 12/29/2023   Component Date Value Ref Range Status    Cholesterol 12/29/2023 192  0 - 199 mg/dL Final          Age      Desirable   Borderline High   High     0-19 Y     0 - 169       170 - 199     >/= 200    20-24 Y     0 - 189       190 - 224     >/= 225         >24 Y     0 - 199       200 - 239     >/= 240   **All ranges are based on fasting samples. Specific   therapeutic targets will vary based on patient-specific   cardiac risk.    Pediatric guidelines reference:Pediatrics 2011, 128(S5).Adult guidelines reference: NCEP ATPIII Guidelines,HIREN 2001, 258:2486-97    Venipuncture immediately after or during the administration of Metamizole may lead to falsely low results. Testing should be performed immediately prior to Metamizole dosing.    HDL-Cholesterol 12/29/2023 39.8  mg/dL Final      Age       Very Low   Low     Normal    High    0-19 Y    < 35      < 40     40-45     ----  20-24 Y    ----     < 40      >45      ----        >24 Y      ----     < 40     40-60      >60      Cholesterol/HDL Ratio 12/29/2023 4.8   Final      Ref Values  Desirable  < 3.4  High Risk  > 5.0    LDL Calculated 12/29/2023 113 (H)  <=99 mg/dL Final                                Near   Borderline      AGE      Desirable  Optimal    High     High     Very High     0-19 Y     0 - 109     ---    110-129   >/= 130     ----    20-24 Y     0 - 119     ---    120-159   >/= 160     ----      >24 Y     0 -  99   100-129  130-159   160-189     >/=190      VLDL 12/29/2023 39  0 - 40 mg/dL Final    Triglycerides 12/29/2023 196 (H)  0 - 149 mg/dL Final       Age         Desirable   Borderline High   High     Very High   0 D-90 D    19 - 174         ----         ----        ----  91 D- 9 Y      0 -  74        75 -  99     >/= 100      ----    10-19 Y     0 -  89        90 - 129     >/= 130      ----    20-24 Y     0 - 114       115 - 149     >/= 150      ----         >24 Y     0 - 149       150 - 199    200- 499    >/= 500    Venipuncture immediately after or during the administration of Metamizole may lead to falsely low results. Testing should be performed immediately prior to Metamizole dosing.    Non HDL Cholesterol 12/29/2023 152 (H)  0 - 149 mg/dL Final          Age       Desirable   Borderline High   High     Very High     0-19 Y     0 - 119       120 - 144     >/= 145    >/= 160    20-24 Y     0 - 149       150 - 189     >/= 190      ----         >24 Y    30 mg/dL above LDL Cholesterol goal      Vitamin D, 25-Hydroxy, Total 12/29/2023 40  30 - 100 ng/mL Final      Physical Exam  Constitutional:       General: She is not in acute distress.     Appearance: She is ill-appearing.   Neurological:      Mental Status: She is alert.         Assessment/Plan   Problem List Items Addressed This Visit             ICD-10-CM    Hyperlipidemia, mixed E78.2     Hold statin while on Paxlovid         COVID-19 - Primary U07.1    Relevant Medications    nirmatrelvir-ritonavir (Paxlovid) 300 mg (150 mg x 2)-100 mg tablet therapy pack

## 2024-03-23 DIAGNOSIS — G60.9 IDIOPATHIC PERIPHERAL NEUROPATHY: ICD-10-CM

## 2024-03-23 RX ORDER — NORTRIPTYLINE HYDROCHLORIDE 25 MG/1
25 CAPSULE ORAL NIGHTLY
Qty: 30 CAPSULE | Refills: 1 | Status: SHIPPED | OUTPATIENT
Start: 2024-03-23 | End: 2024-05-23 | Stop reason: SINTOL

## 2024-05-23 ENCOUNTER — OFFICE VISIT (OUTPATIENT)
Dept: NEUROLOGY | Facility: CLINIC | Age: 79
End: 2024-05-23
Payer: COMMERCIAL

## 2024-05-23 VITALS
DIASTOLIC BLOOD PRESSURE: 78 MMHG | BODY MASS INDEX: 27.49 KG/M2 | SYSTOLIC BLOOD PRESSURE: 114 MMHG | TEMPERATURE: 96.9 F | HEIGHT: 64 IN | WEIGHT: 161 LBS | HEART RATE: 79 BPM

## 2024-05-23 DIAGNOSIS — G60.9 IDIOPATHIC PERIPHERAL NEUROPATHY: Primary | ICD-10-CM

## 2024-05-23 PROCEDURE — 99213 OFFICE O/P EST LOW 20 MIN: CPT | Performed by: PSYCHIATRY & NEUROLOGY

## 2024-05-23 PROCEDURE — 1036F TOBACCO NON-USER: CPT | Performed by: PSYCHIATRY & NEUROLOGY

## 2024-05-23 PROCEDURE — 1159F MED LIST DOCD IN RCRD: CPT | Performed by: PSYCHIATRY & NEUROLOGY

## 2024-05-23 RX ORDER — DULOXETIN HYDROCHLORIDE 30 MG/1
30 CAPSULE, DELAYED RELEASE ORAL DAILY
Qty: 90 CAPSULE | Refills: 0 | Status: SHIPPED | OUTPATIENT
Start: 2024-05-23 | End: 2024-08-21

## 2024-05-23 NOTE — PROGRESS NOTES
NEUROLOGY OUTPATIENT FOLLOW-UP NOTE    Assessment/Plan   Diagnoses and all orders for this visit:  Idiopathic peripheral neuropathy  -     DULoxetine (Cymbalta) 30 mg DR capsule; Take 1 capsule (30 mg) by mouth once daily. Do not crush or chew.      IMPRESSION:  Peripheral neuropathy.    PLAN:  Will try duloxetine with titration to 60 mg daily for neuropathic prophylaxis.  I advised her to call in 2-3 weeks with progress.  I will see her again in 3-4 months or prn.      Clarke Alatorre Jr., M.D., FAAN   ----------    Subjective     Denisse Butler is a 78 y.o. year old female here for follow-up.    The nortriptyline led to severe xerostomia at 50 mg, which while it improved when I recommended dose reduction, led to more discomfort.  She denies other focal neurological symptoms including dysarthria, dysphagia, diplopia, focal weakness, focal sensory change, ataxia, vertigo, or bowel/bladder incontinence, among others.      Past Medical History:   Diagnosis Date    Allergic Hay fever years ago    Cataract 2023 had surgery    Disease of thyroid gland On medication for years    Procedure and treatment not carried out because of patient's decision for unspecified reasons 04/02/2015    Colonoscopy refused     Past Surgical History:   Procedure Laterality Date    BREAST SURGERY      EYE SURGERY      OTHER SURGICAL HISTORY  03/04/2020    Surgery    OTHER SURGICAL HISTORY  03/04/2020    Lumpectomy     Social History     Tobacco Use    Smoking status: Never    Smokeless tobacco: Never   Substance Use Topics    Alcohol use: Never     family history includes No Known Problems in her father and mother.    Current Outpatient Medications:     nortriptyline (Pamelor) 25 mg capsule, Take 1 capsule (25 mg) by mouth once daily at bedtime., Disp: 30 capsule, Rfl: 1    biotin 5 mg capsule, Take daily, Disp: , Rfl:     cholecalciferol (Vitamin D3) 5,000 Units tablet, Take 1 tablet (5,000 Units) by mouth once daily., Disp: 90  "tablet, Rfl: 1    ergocalciferol (Vitamin D-2) 1.25 MG (50949 UT) capsule, 1 capsule (1,250 mcg)., Disp: , Rfl:     ezetimibe (Zetia) 10 mg tablet, Take 1 tablet (10 mg) by mouth once daily. (Patient not taking: Reported on 5/23/2024), Disp: 90 tablet, Rfl: 1    ferrous gluconate 236 mg (27 mg iron) tablet, Take daily, Disp: , Rfl:     levothyroxine (Synthroid, Levoxyl) 75 mcg tablet, Take 1 tablet (75 mcg) by mouth once daily., Disp: 90 tablet, Rfl: 3    meclizine (Antivert) 25 mg tablet, TAKE 1 TABLET 3 TIMES DAILY AS NEEDED., Disp: 45 tablet, Rfl: 1    multivitamin (Daily Multi-Vitamin) tablet, Take daily, Disp: , Rfl:     pravastatin (Pravachol) 80 mg tablet, TAKE 1 TABLET EVERY DAY (STOP SIMVASTATIN), Disp: 90 tablet, Rfl: 3    zinc sulfate (Zincate) 220 (50 Zn) MG capsule, Take daily, Disp: , Rfl:   Allergies   Allergen Reactions    Oxycodone-Acetaminophen Unknown    Prednisone Unknown       Objective     /78   Pulse 79   Temp 36.1 °C (96.9 °F)   Ht 1.626 m (5' 4\")   Wt 73 kg (161 lb)   BMI 27.64 kg/m²     CONSTITUTIONAL:  No acute distress    MENTAL STATUS:  Awake, alert, fully oriented to self, place, and time, with present short-term memory, good awareness of recent events, normal attention span, concentration, and fund of knowledge.    SPEECH AND LANGUAGE:  Can name and repeat, follows all commands, has no dysarthria    CRANIAL NERVES:  II-Vision present, visual fields full to confrontational testing    III/IV/VI--EOMs are present in all directions.  Pupils are symmetrically reactive in dim light.  No ptosis.    V--Normal facial sensation.    VII--No facial asymmetry.    VIII--Hearing present to voice bilaterally.    IX/X--Symmetric soft palate rise.    XI--Normal trapezius power bilaterally.    XII--Tongue protrudes without deviation.    MOTOR:  Normal power, tone, and bulk in both arms and both legs.    SENSORY:  Reduced pin sensation in a stocking-glove distribution from the feet to the upper " shins and from the fingers to the distal hand.  No asymmetry or spinal sensory level.     COORDINATION:  Normal finger-to-nose and heel-to-shin testing in both arms and both legs.    REFLEXES are normal and symmetric at the biceps, triceps, brachioradialis, patella, and ankle.  The plantar responses are flexor.    GAIT is normal, without steppage, ataxia, shuffling, or spasticity.      Clarke Alatorre Jr., M.D., [facility]AN

## 2024-07-16 DIAGNOSIS — E78.2 HYPERLIPIDEMIA, MIXED: ICD-10-CM

## 2024-07-16 RX ORDER — PRAVASTATIN SODIUM 80 MG/1
TABLET ORAL
Qty: 90 TABLET | Refills: 0 | Status: SHIPPED | OUTPATIENT
Start: 2024-07-16

## 2024-07-22 ENCOUNTER — APPOINTMENT (OUTPATIENT)
Dept: PRIMARY CARE | Facility: CLINIC | Age: 79
End: 2024-07-22
Payer: COMMERCIAL

## 2024-07-22 ENCOUNTER — TELEPHONE (OUTPATIENT)
Dept: NEUROLOGY | Facility: CLINIC | Age: 79
End: 2024-07-22

## 2024-07-22 VITALS
OXYGEN SATURATION: 96 % | WEIGHT: 159.4 LBS | BODY MASS INDEX: 27.36 KG/M2 | HEART RATE: 89 BPM | SYSTOLIC BLOOD PRESSURE: 112 MMHG | DIASTOLIC BLOOD PRESSURE: 74 MMHG

## 2024-07-22 DIAGNOSIS — R42 VERTIGO: ICD-10-CM

## 2024-07-22 DIAGNOSIS — E55.9 VITAMIN D DEFICIENCY: ICD-10-CM

## 2024-07-22 DIAGNOSIS — Z00.00 ROUTINE GENERAL MEDICAL EXAMINATION AT A HEALTH CARE FACILITY: Primary | ICD-10-CM

## 2024-07-22 DIAGNOSIS — E03.8 HYPOTHYROIDISM DUE TO HASHIMOTO'S THYROIDITIS: ICD-10-CM

## 2024-07-22 DIAGNOSIS — E06.3 HYPOTHYROIDISM DUE TO HASHIMOTO'S THYROIDITIS: ICD-10-CM

## 2024-07-22 DIAGNOSIS — Z12.31 VISIT FOR SCREENING MAMMOGRAM: ICD-10-CM

## 2024-07-22 DIAGNOSIS — G60.9 IDIOPATHIC PERIPHERAL NEUROPATHY: Primary | ICD-10-CM

## 2024-07-22 DIAGNOSIS — M77.8 RIGHT SHOULDER TENDONITIS: ICD-10-CM

## 2024-07-22 DIAGNOSIS — E78.2 HYPERLIPIDEMIA, MIXED: ICD-10-CM

## 2024-07-22 LAB
25(OH)D3 SERPL-MCNC: 13 NG/ML (ref 30–100)
ALBUMIN SERPL BCP-MCNC: 4 G/DL (ref 3.4–5)
ALP SERPL-CCNC: 84 U/L (ref 33–136)
ALT SERPL W P-5'-P-CCNC: 23 U/L (ref 7–45)
ANION GAP SERPL CALC-SCNC: 13 MMOL/L (ref 10–20)
AST SERPL W P-5'-P-CCNC: 24 U/L (ref 9–39)
BILIRUB SERPL-MCNC: 0.4 MG/DL (ref 0–1.2)
BUN SERPL-MCNC: 15 MG/DL (ref 6–23)
CALCIUM SERPL-MCNC: 9.2 MG/DL (ref 8.6–10.6)
CHLORIDE SERPL-SCNC: 102 MMOL/L (ref 98–107)
CHOLEST SERPL-MCNC: 192 MG/DL (ref 0–199)
CHOLESTEROL/HDL RATIO: 4.6
CO2 SERPL-SCNC: 28 MMOL/L (ref 21–32)
CREAT SERPL-MCNC: 0.83 MG/DL (ref 0.5–1.05)
EGFRCR SERPLBLD CKD-EPI 2021: 73 ML/MIN/1.73M*2
ERYTHROCYTE [DISTWIDTH] IN BLOOD BY AUTOMATED COUNT: 13.3 % (ref 11.5–14.5)
GLUCOSE SERPL-MCNC: 85 MG/DL (ref 74–99)
HCT VFR BLD AUTO: 39.2 % (ref 36–46)
HDLC SERPL-MCNC: 41.8 MG/DL
HGB BLD-MCNC: 12.2 G/DL (ref 12–16)
LDLC SERPL CALC-MCNC: 129 MG/DL
MCH RBC QN AUTO: 28.2 PG (ref 26–34)
MCHC RBC AUTO-ENTMCNC: 31.1 G/DL (ref 32–36)
MCV RBC AUTO: 91 FL (ref 80–100)
NON HDL CHOLESTEROL: 150 MG/DL (ref 0–149)
NRBC BLD-RTO: 0 /100 WBCS (ref 0–0)
PLATELET # BLD AUTO: 241 X10*3/UL (ref 150–450)
POTASSIUM SERPL-SCNC: 4.4 MMOL/L (ref 3.5–5.3)
PROT SERPL-MCNC: 7.4 G/DL (ref 6.4–8.2)
RBC # BLD AUTO: 4.33 X10*6/UL (ref 4–5.2)
SODIUM SERPL-SCNC: 139 MMOL/L (ref 136–145)
TRIGL SERPL-MCNC: 105 MG/DL (ref 0–149)
TSH SERPL-ACNC: 0.99 MIU/L (ref 0.44–3.98)
VIT B12 SERPL-MCNC: 1484 PG/ML (ref 211–911)
VLDL: 21 MG/DL (ref 0–40)
WBC # BLD AUTO: 4 X10*3/UL (ref 4.4–11.3)

## 2024-07-22 PROCEDURE — 1124F ACP DISCUSS-NO DSCNMKR DOCD: CPT | Performed by: FAMILY MEDICINE

## 2024-07-22 PROCEDURE — 85027 COMPLETE CBC AUTOMATED: CPT

## 2024-07-22 PROCEDURE — 80053 COMPREHEN METABOLIC PANEL: CPT

## 2024-07-22 PROCEDURE — 82306 VITAMIN D 25 HYDROXY: CPT

## 2024-07-22 PROCEDURE — 36415 COLL VENOUS BLD VENIPUNCTURE: CPT

## 2024-07-22 PROCEDURE — 80061 LIPID PANEL: CPT

## 2024-07-22 PROCEDURE — 82607 VITAMIN B-12: CPT

## 2024-07-22 PROCEDURE — 1159F MED LIST DOCD IN RCRD: CPT | Performed by: FAMILY MEDICINE

## 2024-07-22 PROCEDURE — 84443 ASSAY THYROID STIM HORMONE: CPT

## 2024-07-22 PROCEDURE — 99397 PER PM REEVAL EST PAT 65+ YR: CPT | Performed by: FAMILY MEDICINE

## 2024-07-22 RX ORDER — MECLIZINE HYDROCHLORIDE 25 MG/1
TABLET ORAL
Qty: 45 TABLET | Refills: 1 | Status: SHIPPED | OUTPATIENT
Start: 2024-07-22

## 2024-07-22 RX ORDER — DICLOFENAC SODIUM 10 MG/G
4 GEL TOPICAL 4 TIMES DAILY
Qty: 100 G | Refills: 1 | Status: SHIPPED | OUTPATIENT
Start: 2024-07-22

## 2024-07-22 ASSESSMENT — ENCOUNTER SYMPTOMS
LOSS OF SENSATION IN FEET: 0
DEPRESSION: 0
OCCASIONAL FEELINGS OF UNSTEADINESS: 0

## 2024-07-22 NOTE — PROGRESS NOTES
Subjective     Patient ID: Denisse Butler is a 78 y.o. female who presents for Annual Exam (Pt here for yearly physical - has form to fill out/Pt with right arm upper pain constantly for 2 weeks ).      Last Physical : 1 Years ago     Pt's PMH, PSH, SH, FH , meds and allergies was obtained / reviewed and updated .     Dental visits : Y  Vision issues : N  Hearing issues : N    Immunizations : Y    Diet :  could be better  Exercise:  Weight concerns :     Alcohol: as noted in SH  Tobacco: as noted in SH  Recreational drug use : None/ as noted in SH    Sexually active : Active   Contraception :   Menstrual problems:  Premenopausal/perimenopausal/ postmenopausal:    G:  Parity:  Full term:    Premature:   (s):   Living :  Ab induced:   Ab spontaneous :  Ectopic :   Multiple :    PAP smear :  Mammogram :  Colonoscopy:    Metabolic screening   - Lipid   - Glucose  ======================================================    Visit Vitals  /74   Pulse 89   Wt 72.3 kg (159 lb 6.4 oz)   SpO2 96%   BMI 27.36 kg/m²   Smoking Status Never   BSA 1.81 m²      No LMP recorded.     =====================================    Review of systems:  Constitutional: no chills, no fever and no night sweats.     Eyes: no blurred vision and no eyesight problems.     ENT: no hearing loss, no nasal congestion, no nasal discharge, no hoarseness and no sore throat.     Cardiovascular: no chest pain, no intermittent leg claudication, no lower extremity edema, no palpitations and no syncope.     Respiratory: no cough, no shortness of breath during exertion, no shortness of breath at rest and no wheezing.     Gastrointestinal: no abdominal pain, no blood in stools, no constipation, no diarrhea, no melena, no nausea, no rectal pain and no vomiting.     Genitourinary: no dysuria, no change in urinary frequency, no urinary hesitancy, no feelings of urinary urgency and no vaginal discharge.     Musculoskeletal: shoulder  pain    Integumentary: no new skin lesions and no rashes.     Neurological: no difficulty walking, no headache, no limb weakness, no numbness and no tingling.     Psychiatric: no anxiety, no depression, no anhedonia and no substance use disorders.     Endocrine: no recent weight gain and no recent weight loss.     Hematologic/Lymphatic: no tendency for easy bruising and no swollen glands.   ============================================================    Physical exam :    Constitutional: Alert and in no acute distress. Well developed, well nourished.     Eyes: Normal external exam. Pupils were equal in size, round, reactive to light (PERRL) with normal accommodation and extraocular movements intact (EOMI).     Ears, Nose, Mouth, and Throat: External inspection of ears and nose: Normal.  Otoscopic examination: Normal.      Neck: No neck mass was observed. Supple.     Cardiovascular: Heart rate and rhythm were normal, normal S1 and S2, no gallops, no murmurs and no pericardial rub    Pulmonary: No respiratory distress. Clear bilateral breath sounds.     Abdomen: Soft nontender; no abdominal mass palpated. No organomegaly.     Musculoskeletal: No joint swelling seen, decressed t=range ROM right houlder    Skin: Normal skin color and pigmentation, normal skin turgor, and no rash.     Neurologic: Deep tendon reflexes were 2+ and symmetric. Sensation: Normal.     Psychiatric: Judgment and insight: Intact. Mood and affect: Normal.    Lymphatic : Cervical/ axillary/ groin Lns Palpable/ non palpable            All other systems have been reviewed and are negative for complaint.      Assessment/Plan    Problem List Items Addressed This Visit             ICD-10-CM    Hyperlipidemia, mixed E78.2     Check lipid panel continue medications continue healthy eating work out  150 minutes a week           Hypothyroidism E03.9     C/w Synthroid         Vertigo R42    Relevant Medications    meclizine (Antivert) 25 mg tablet    Vitamin  D deficiency E55.9    Relevant Orders    Vitamin D 25-Hydroxy,Total (for eval of Vitamin D levels) (Completed)    Visit for screening mammogram Z12.31    Relevant Orders    BI mammo bilateral screening tomosynthesis    Routine general medical examination at a health care facility - Primary Z00.00    Relevant Orders    Vitamin D 25-Hydroxy,Total (for eval of Vitamin D levels) (Completed)    TSH with reflex to Free T4 if abnormal (Completed)    Lipid Panel (Completed)    Comprehensive Metabolic Panel (Completed)    CBC (Completed)    Vitamin B12 (Completed)    Right shoulder tendonitis M77.8    Relevant Medications    diclofenac sodium (Voltaren) 1 % gel    Other Relevant Orders    XR shoulder right 2+ views

## 2024-07-23 DIAGNOSIS — E05.90 HYPERTHYROIDISM: ICD-10-CM

## 2024-07-23 DIAGNOSIS — E55.9 VITAMIN D DEFICIENCY: Primary | ICD-10-CM

## 2024-07-23 RX ORDER — ERGOCALCIFEROL 1.25 MG/1
1 CAPSULE ORAL
Qty: 13 CAPSULE | Refills: 3 | Status: SHIPPED | OUTPATIENT
Start: 2024-07-28

## 2024-07-23 RX ORDER — LEVOTHYROXINE SODIUM 75 UG/1
75 TABLET ORAL DAILY
Qty: 90 TABLET | Refills: 2 | Status: SHIPPED | OUTPATIENT
Start: 2024-07-23

## 2024-07-26 RX ORDER — NORTRIPTYLINE HYDROCHLORIDE 10 MG/1
30 CAPSULE ORAL NIGHTLY
Qty: 90 CAPSULE | Refills: 11 | Status: SHIPPED | OUTPATIENT
Start: 2024-07-26 | End: 2025-07-26

## 2024-07-28 PROBLEM — E05.90 HYPERTHYROIDISM: Status: RESOLVED | Noted: 2023-08-06 | Resolved: 2024-07-28

## 2024-08-02 ENCOUNTER — HOSPITAL ENCOUNTER (OUTPATIENT)
Dept: RADIOLOGY | Facility: HOSPITAL | Age: 79
Discharge: HOME | End: 2024-08-02
Payer: COMMERCIAL

## 2024-08-02 DIAGNOSIS — M77.8 RIGHT SHOULDER TENDONITIS: ICD-10-CM

## 2024-08-02 PROCEDURE — 73030 X-RAY EXAM OF SHOULDER: CPT | Mod: RT

## 2024-08-02 PROCEDURE — 73030 X-RAY EXAM OF SHOULDER: CPT | Mod: RIGHT SIDE | Performed by: RADIOLOGY

## 2024-08-06 DIAGNOSIS — M77.8 RIGHT SHOULDER TENDONITIS: Primary | ICD-10-CM

## 2024-08-12 ENCOUNTER — OFFICE VISIT (OUTPATIENT)
Dept: ORTHOPEDIC SURGERY | Facility: HOSPITAL | Age: 79
End: 2024-08-12
Payer: COMMERCIAL

## 2024-08-12 VITALS — WEIGHT: 157 LBS | HEIGHT: 66 IN | BODY MASS INDEX: 25.23 KG/M2

## 2024-08-12 DIAGNOSIS — M77.8 RIGHT SHOULDER TENDONITIS: ICD-10-CM

## 2024-08-12 PROCEDURE — 1123F ACP DISCUSS/DSCN MKR DOCD: CPT | Performed by: ORTHOPAEDIC SURGERY

## 2024-08-12 PROCEDURE — 20610 DRAIN/INJ JOINT/BURSA W/O US: CPT | Mod: RT | Performed by: ORTHOPAEDIC SURGERY

## 2024-08-12 PROCEDURE — 99204 OFFICE O/P NEW MOD 45 MIN: CPT | Performed by: ORTHOPAEDIC SURGERY

## 2024-08-12 PROCEDURE — 2500000005 HC RX 250 GENERAL PHARMACY W/O HCPCS: Performed by: ORTHOPAEDIC SURGERY

## 2024-08-12 PROCEDURE — 99214 OFFICE O/P EST MOD 30 MIN: CPT | Mod: 25 | Performed by: ORTHOPAEDIC SURGERY

## 2024-08-12 PROCEDURE — 1159F MED LIST DOCD IN RCRD: CPT | Performed by: ORTHOPAEDIC SURGERY

## 2024-08-12 PROCEDURE — 2500000004 HC RX 250 GENERAL PHARMACY W/ HCPCS (ALT 636 FOR OP/ED): Performed by: ORTHOPAEDIC SURGERY

## 2024-08-12 RX ORDER — LIDOCAINE HYDROCHLORIDE 10 MG/ML
4 INJECTION INFILTRATION; PERINEURAL
Status: COMPLETED | OUTPATIENT
Start: 2024-08-12 | End: 2024-08-12

## 2024-08-12 RX ORDER — TRIAMCINOLONE ACETONIDE 40 MG/ML
40 INJECTION, SUSPENSION INTRA-ARTICULAR; INTRAMUSCULAR
Status: COMPLETED | OUTPATIENT
Start: 2024-08-12 | End: 2024-08-12

## 2024-08-12 NOTE — PROGRESS NOTES
L Inj/Asp: R subacromial bursa on 8/12/2024 8:04 AM  Indications: pain  Details: 22 G needle, posterior approach  Medications: 40 mg triamcinolone acetonide 40 mg/mL; 4 mL lidocaine 10 mg/mL (1 %)  Outcome: tolerated well, no immediate complications  Procedure, treatment alternatives, risks and benefits explained, specific risks discussed. Consent was given by the patient. Immediately prior to procedure a time out was called to verify the correct patient, procedure, equipment, support staff and site/side marked as required.

## 2024-08-12 NOTE — PROGRESS NOTES
Here for evaluation of her right shoulder she has right shoulder pain at the extremes of range of motion and night pain with laying on her side.  No specific history of injury no paresthesias no left shoulder symptoms.    The patient is pleasant and cooperative.  The patient is alert and oriented ×3.  Auditory function is intact.  The patient is a good historian.  The patient is not in acute distress.  Eye exam significant for nonicteric sclera, intact ocular muscle movement.  Breathing is rhythmic symmetric and nonlabored.  Right radial pulse palpable brisk cap refill light touch sensation intact.  No peripheral edema.   strength 5/5 motor power in abduction 5/5.  Range of motion slightly limited and pain at the extremes of range of motion external rotation and abduction 80 degrees internal rotation and abduction 50 degrees.  No apprehension.    X-rays appear normal no fracture dislocation subluxation or arthritic degenerative changes subacromial glenohumeral spaces well-preserved    Right shoulder pain    Patient has right shoulder pain consistent with subacromial origin she also has slightly decreased range of motion.  We discussed differential diagnosis and treatment options and alternatives have recommended acromial injection based on presumptive diagnosis of subacromial impingement/bursitis.  Injection was performed today and tolerated well the patient is going to call back next week and advise us of her progress.  If she does not get complete relief I recommend formal physical therapy.    This was dictated using voice recognition software and not corrected for grammatical or spelling errors.

## 2024-08-17 ENCOUNTER — HOSPITAL ENCOUNTER (OUTPATIENT)
Dept: RADIOLOGY | Facility: CLINIC | Age: 79
Discharge: HOME | End: 2024-08-17
Payer: COMMERCIAL

## 2024-08-17 VITALS — WEIGHT: 156.97 LBS | HEIGHT: 66 IN | BODY MASS INDEX: 25.23 KG/M2

## 2024-08-17 DIAGNOSIS — Z12.31 VISIT FOR SCREENING MAMMOGRAM: ICD-10-CM

## 2024-08-17 PROCEDURE — 77067 SCR MAMMO BI INCL CAD: CPT | Performed by: RADIOLOGY

## 2024-08-17 PROCEDURE — 77067 SCR MAMMO BI INCL CAD: CPT

## 2024-08-17 PROCEDURE — 77063 BREAST TOMOSYNTHESIS BI: CPT | Performed by: RADIOLOGY

## 2024-08-21 ENCOUNTER — PATIENT MESSAGE (OUTPATIENT)
Dept: ORTHOPEDIC SURGERY | Facility: HOSPITAL | Age: 79
End: 2024-08-21
Payer: COMMERCIAL

## 2024-08-21 DIAGNOSIS — M77.8 RIGHT SHOULDER TENDONITIS: ICD-10-CM

## 2024-09-11 DIAGNOSIS — E78.2 HYPERLIPIDEMIA, MIXED: ICD-10-CM

## 2024-09-11 DIAGNOSIS — E05.90 HYPERTHYROIDISM: ICD-10-CM

## 2024-09-11 RX ORDER — PRAVASTATIN SODIUM 80 MG/1
TABLET ORAL
Qty: 90 TABLET | Refills: 1 | Status: SHIPPED | OUTPATIENT
Start: 2024-09-11

## 2024-09-11 RX ORDER — LEVOTHYROXINE SODIUM 75 UG/1
75 TABLET ORAL DAILY
Qty: 90 TABLET | Refills: 1 | Status: SHIPPED | OUTPATIENT
Start: 2024-09-11 | End: 2025-09-11

## 2024-09-21 DIAGNOSIS — M77.8 RIGHT SHOULDER TENDONITIS: ICD-10-CM

## 2024-09-22 RX ORDER — DICLOFENAC SODIUM 10 MG/G
4 GEL TOPICAL 4 TIMES DAILY
Qty: 100 G | Refills: 1 | Status: SHIPPED | OUTPATIENT
Start: 2024-09-22

## 2024-09-23 ENCOUNTER — APPOINTMENT (OUTPATIENT)
Dept: NEUROLOGY | Facility: CLINIC | Age: 79
End: 2024-09-23
Payer: COMMERCIAL

## 2024-09-23 VITALS
SYSTOLIC BLOOD PRESSURE: 137 MMHG | HEART RATE: 100 BPM | BODY MASS INDEX: 27.31 KG/M2 | HEIGHT: 64 IN | TEMPERATURE: 96.9 F | DIASTOLIC BLOOD PRESSURE: 81 MMHG | WEIGHT: 160 LBS

## 2024-09-23 DIAGNOSIS — G60.9 IDIOPATHIC PERIPHERAL NEUROPATHY: Primary | ICD-10-CM

## 2024-09-23 PROCEDURE — 1159F MED LIST DOCD IN RCRD: CPT | Performed by: PSYCHIATRY & NEUROLOGY

## 2024-09-23 PROCEDURE — 99213 OFFICE O/P EST LOW 20 MIN: CPT | Performed by: PSYCHIATRY & NEUROLOGY

## 2024-09-23 PROCEDURE — 1160F RVW MEDS BY RX/DR IN RCRD: CPT | Performed by: PSYCHIATRY & NEUROLOGY

## 2024-09-23 PROCEDURE — 1123F ACP DISCUSS/DSCN MKR DOCD: CPT | Performed by: PSYCHIATRY & NEUROLOGY

## 2024-09-23 PROCEDURE — 1036F TOBACCO NON-USER: CPT | Performed by: PSYCHIATRY & NEUROLOGY

## 2024-09-23 NOTE — PROGRESS NOTES
NEUROLOGY OUTPATIENT FOLLOW-UP NOTE    Assessment/Plan   Diagnoses and all orders for this visit:  Idiopathic peripheral neuropathy      IMPRESSION:  Improved peripheral neuropathy    PLAN:  To continue nortriptyline 10 mg qam and 20 mg qpm.  I will see her again in six months or prn.      Clarke Alatorre Jr., M.D., FAAN   ----------    Subjective     Denisse Butler is a 79 y.o. year old female here for follow-up.    She could not tolerate duloxetine because of severe constipation, and preferred to be changed back to nortriptyline two months ago.  She is taking it at 10 mg qam and 20 mg qpm, and the burning is resolved, without side effects.    She denies other focal neurological symptoms including dysarthria, dysphagia, diplopia, focal weakness, other focal sensory change, ataxia, vertigo, or bowel/bladder incontinence, among others.      Past Medical History:   Diagnosis Date    Allergic Hay fever years ago    Cataract 2023 had surgery    Disease of thyroid gland On medication for years    Procedure and treatment not carried out because of patient's decision for unspecified reasons 04/02/2015    Colonoscopy refused     Past Surgical History:   Procedure Laterality Date    BREAST BIOPSY Right     excisional benign    BREAST SURGERY      EYE SURGERY      OTHER SURGICAL HISTORY  03/04/2020    Surgery    OTHER SURGICAL HISTORY  03/04/2020    Lumpectomy     Social History     Tobacco Use    Smoking status: Never    Smokeless tobacco: Never   Substance Use Topics    Alcohol use: Never     family history includes No Known Problems in her father and mother.    Current Outpatient Medications:     biotin 5 mg capsule, Take daily, Disp: , Rfl:     diclofenac sodium (Voltaren) 1 % gel, APPLY 4.5 INCHES (4 G) TOPICALLY 4 TIMES A DAY., Disp: 100 g, Rfl: 1    ergocalciferol (Vitamin D-2) 1.25 MG (64705 UT) capsule, Take 1 capsule (1,250 mcg) by mouth 1 (one) time per week., Disp: 13 capsule, Rfl: 3    levothyroxine  "(Synthroid, Levoxyl) 75 mcg tablet, Take 1 tablet (75 mcg) by mouth early in the morning.., Disp: 90 tablet, Rfl: 1    meclizine (Antivert) 25 mg tablet, TAKE 1 TABLET 3 TIMES DAILY AS NEEDED., Disp: 45 tablet, Rfl: 1    multivitamin (Daily Multi-Vitamin) tablet, Take daily, Disp: , Rfl:     nortriptyline (Pamelor) 10 mg capsule, Take 3 capsules (30 mg) by mouth once daily at bedtime., Disp: 90 capsule, Rfl: 11    pravastatin (Pravachol) 80 mg tablet, , Disp: 90 tablet, Rfl: 1  Allergies   Allergen Reactions    Oxycodone-Acetaminophen Hives    Prednisone Unknown       Objective     /81   Pulse 100   Temp 36.1 °C (96.9 °F)   Ht 1.626 m (5' 4\")   Wt 72.6 kg (160 lb)   BMI 27.46 kg/m²     CONSTITUTIONAL:  No acute distress    MENTAL STATUS:  Awake, alert, fully oriented to self, place, and time, with present short-term memory, good awareness of recent events, normal attention span, concentration, and fund of knowledge.    SPEECH AND LANGUAGE:  Can name and repeat, follows all commands, has no dysarthria    CRANIAL NERVES:  II-Vision present, visual fields full to confrontational testing    III/IV/VI--EOMs are present in all directions.  Pupils are symmetrically reactive in dim light.  No ptosis.    V--Normal facial sensation.    VII--No facial asymmetry.    VIII--Hearing present to voice bilaterally.    IX/X--Symmetric soft palate rise.    XI--Normal trapezius power bilaterally.    XII--Tongue protrudes without deviation.    MOTOR:  Normal power, tone, and bulk in both arms and both legs.    SENSORY:  Reduced pin sensation in a stocking distribution from the feet to the upper shins and from the fingers to the distal hand.  No asymmetry, or spinal sensory level.    COORDINATION:  Normal finger-to-nose and heel-to-shin testing in both arms and both legs.    REFLEXES are normal and symmetric at the biceps, triceps, brachioradialis, patella, and ankle.  The plantar responses are flexor.    GAIT is normal, " without steppage, ataxia, shuffling, or spasticity.      Clarke Alatorre Jr., M.D., [facility]

## 2024-09-25 ENCOUNTER — EVALUATION (OUTPATIENT)
Dept: PHYSICAL THERAPY | Facility: CLINIC | Age: 79
End: 2024-09-25
Payer: COMMERCIAL

## 2024-09-25 DIAGNOSIS — M77.8 RIGHT SHOULDER TENDONITIS: ICD-10-CM

## 2024-09-25 PROCEDURE — 97110 THERAPEUTIC EXERCISES: CPT | Mod: GP | Performed by: PHYSICAL THERAPIST

## 2024-09-25 PROCEDURE — 97161 PT EVAL LOW COMPLEX 20 MIN: CPT | Mod: GP | Performed by: PHYSICAL THERAPIST

## 2024-09-25 ASSESSMENT — ENCOUNTER SYMPTOMS
DEPRESSION: 0
LOSS OF SENSATION IN FEET: 0
OCCASIONAL FEELINGS OF UNSTEADINESS: 0

## 2024-09-25 NOTE — PROGRESS NOTES
Physical Therapy    Physical Therapy Evaluation and Treatment      Patient Name: Denisse Butler  MRN: [id number]  Today's Date: 9/25/2024  Visit: 1  Insurance: Reviewed  Physician: Alex Curiel  Problem List Items Addressed This Visit             ICD-10-CM    Right shoulder tendonitis M77.8    Relevant Orders    Follow Up In Physical Therapy        Time Entry:   Time Calculation  Start Time: 0400  Stop Time: 0435  Time Calculation (min): 35 min  PT Evaluation Time Entry  PT Evaluation (Low) Time Entry: 25  PT Therapeutic Procedures Time Entry  Therapeutic Exercise Time Entry: 10      Assessment:  Patient seen in PT for Initial Evaluation for right shoulder pain.   Patient presents with postural deviation  decreased shoulder ROM , decreased shoulder strength, shoulder tenderness, positive impingement.  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  Patient rates quickdash at 18.2%.  Normal Xrays      PT Assessment  Rehab Prognosis: Good  Evaluation/Treatment Tolerance: Patient tolerated treatment well     Plan:  Continue with POC  Shoulder pulleys/ube, shoulder ROM/strength (thor rtc), PRE's, CKC, HP  OP PT Plan  PT Plan: Skilled PT  PT Frequency: 1 time per week  Duration: 6  Onset Date: 08/21/24  Certification Period Start Date: 09/25/24  Certification Period End Date: 12/24/24  Rehab Potential: Good  Plan of Care Agreement: Patient    Current Problem:   1. Right shoulder tendonitis  Referral to Physical Therapy    Follow Up In Physical Therapy          Subjective    General: Patient with a history of right shoulder pain since May.  Onset was insidious.  Patient treated with antiinflammatory gel.  Patient complains of pain in the region of the right deltoid, ache pain, 5/10 at worst last 7 days.  Patient's pain is worse with lifting, reaching, pushing/pulling.  Functionally, patient is avoiding lifting, and starting lawn frank.  - 6.5hours - 5 days a day         Precautions: none  Precautions  STEADI Fall Risk Score (The score of 4 or more indicates an increased risk of falling): 0  Prior Level of Function: able to do all normal ADL's with left arm before May       Objective     Postural deviation: rounded shoulders, FH- atrophy right infraspinatus  right Shoulder ROM to 130 flexion, 140 abduction, 65 er, and HBB to LS junction  right Shoulder strength 4/5  flex, 4/5 abduction, 4+/5  ER, and 4/5  IR  Special tests: positive impingement  Patient tender to palpation at the rtc, deltoid    Outcome Measures:  Other Measures  Disability of Arm Shoulder Hand (DASH): 19     Treatments:  Patient instructed in HEP including: Codman's 20 each, wand assisted scaption and extension 10 each, and resisted shoulder ext, add and ir with red theraband 15X each (10 minutes)      EDUCATION: HEP       Goals:  Active       PT Problem       PT Goal 1       Start:  09/25/24    Expected End:  12/24/24       Shoulder pain 2/10 or less         PT Goal 2       Start:  09/25/24    Expected End:  12/24/24       Improve quickdash by 10%         PT Goal 3       Start:  09/25/24    Expected End:  12/24/24       Shoulder ROM elevation 150, er 70, HBB to TL junction         PT Goal 4       Start:  09/25/24    Expected End:  12/24/24       5/5 shoulder strength

## 2024-10-14 ENCOUNTER — TREATMENT (OUTPATIENT)
Dept: PHYSICAL THERAPY | Facility: CLINIC | Age: 79
End: 2024-10-14
Payer: COMMERCIAL

## 2024-10-14 DIAGNOSIS — M77.8 RIGHT SHOULDER TENDONITIS: ICD-10-CM

## 2024-10-14 PROCEDURE — 97110 THERAPEUTIC EXERCISES: CPT | Mod: GP,CQ

## 2024-10-14 ASSESSMENT — PAIN SCALES - GENERAL: PAINLEVEL_OUTOF10: 5 - MODERATE PAIN

## 2024-10-14 ASSESSMENT — PAIN - FUNCTIONAL ASSESSMENT: PAIN_FUNCTIONAL_ASSESSMENT: 0-10

## 2024-10-14 NOTE — PROGRESS NOTES
Physical Therapy    Physical Therapy Treatment    Patient Name: Denisse Butler  MRN: [id number]  Today's Date: 10/14/2024  Time Entry:   Time Calculation  Start Time: 1130  Stop Time: 1215  Time Calculation (min): 45 min     PT Therapeutic Procedures Time Entry  Therapeutic Exercise Time Entry: 45                 Visit: 2  Insurance: Reviewed  Physician: Alex Curiel    Assessment:  PT Assessment  Evaluation/Treatment Tolerance: Patient tolerated treatment well  Response to session is good. Patient has good understanding and grasp of her exercises. Minimal cueing needed. Gradual increase in shoulder fatigue near end of session and aching after IR stretching. No other noted issues today, doing well when leaving.     Plan:   Continue with POC  Shoulder pulleys/ube, shoulder ROM/strength (thor rtc), PRE's, CKC, HP  OP PT Plan  PT Plan: Skilled PT  PT Frequency: 1 time per week  Duration: 6  Onset Date: 08/21/24  Certification Period Start Date: 09/25/24  Certification Period End Date: 12/24/24  Rehab Potential: Good  Plan of Care Agreement: Patient    Current Problem  1. Right shoulder tendonitis  Follow Up In Physical Therapy      General        Subjective    My pain is a lot less than its been, currently at a 5/10  HEP completed every day, no issues  Behind back IR is limited and most painful movement  No other updates or concerns at the moment    Pain  Pain Assessment  Pain Assessment: 0-10  0-10 (Numeric) Pain Score: 5 - Moderate pain    Objective     Treatments:  Therapeutic Exercise  Therapeutic Exercise Performed: Yes  Pulleys x 4 min  Cane assisted scaption x 20  Cane assisted ext x 20  Cane assisted flexion x 20  Bilateral ER holding cane x 20  GTB Rowing x 20  GTB shoulder x 20  GTB adduction x 20  GTB IR x 20  Functional reach to top of theraband shelf x 20  IR strap gentle stretching x 20    OP EDUCATION:     Goals:  Active       PT Problem       PT Goal 1       Start:  09/25/24    Expected End:   12/24/24       Shoulder pain 2/10 or less         PT Goal 2       Start:  09/25/24    Expected End:  12/24/24       Improve quickdash by 10%         PT Goal 3       Start:  09/25/24    Expected End:  12/24/24       Shoulder ROM elevation 150, er 70, HBB to TL junction         PT Goal 4       Start:  09/25/24    Expected End:  12/24/24       5/5 shoulder strength

## 2024-10-18 DIAGNOSIS — E78.2 HYPERLIPIDEMIA, MIXED: ICD-10-CM

## 2024-10-18 RX ORDER — PRAVASTATIN SODIUM 80 MG/1
TABLET ORAL
Qty: 90 TABLET | Refills: 0 | Status: SHIPPED | OUTPATIENT
Start: 2024-10-18

## 2024-10-23 ENCOUNTER — TREATMENT (OUTPATIENT)
Dept: PHYSICAL THERAPY | Facility: CLINIC | Age: 79
End: 2024-10-23
Payer: COMMERCIAL

## 2024-10-23 DIAGNOSIS — M77.8 RIGHT SHOULDER TENDONITIS: ICD-10-CM

## 2024-10-23 PROCEDURE — 97110 THERAPEUTIC EXERCISES: CPT | Mod: GP | Performed by: PHYSICAL THERAPIST

## 2024-10-23 NOTE — PROGRESS NOTES
Physical Therapy    Physical Therapy Treatment    Patient Name: Denisse Butler  MRN: [id number]  Today's Date: 10/23/2024  Visit: 3/6  Insurance: LAXMI  Authorization: 9/25/24-12/24/24    Time Entry:   Time Calculation  Start Time: 0140  Stop Time: 0220  Time Calculation (min): 40 min     PT Therapeutic Procedures Time Entry  Therapeutic Exercise Time Entry: 40  Visit: 3  Insurance: Reviewed  Physician: Alex Curiel    Assessment: Patient with continued moderate pain levels.  Instructed patient in use of TENS for home pain control.  Patient to try unit at home.           Plan:   Continue with POC  Shoulder pulleys/ube, shoulder ROM/strength (thor rtc), PRE's, CKC, HP  OP PT Plan  PT Plan: Skilled PT  PT Frequency: 1 time per week  Duration: 6  Onset Date: 08/21/24  Certification Period Start Date: 09/25/24  Certification Period End Date: 12/24/24  Rehab Potential: Good  Plan of Care Agreement: Patient    Current Problem  1. Right shoulder tendonitis  Follow Up In Physical Therapy      General        Subjective    Right shoulder pain 5/10 at worst last 2 days      Pain       Objective     Treatments:     Pulleys x 4 min  Ball rolls on table 20X   Cane assisted scaption x 20  Cane assisted ext x 20  Cane assisted flexion x 20  Bilateral ER holding cane x 20  GTB Rowing x 20  GTB shoulder x 20  Resisted shoulder er and abd with yellow theraband 15X each  Shoulder press, flies and triceps with 2# 2 X 10  UBE 5 minutes  (40 minutes)    OP EDUCATION: HEP     Goals:  Active       PT Problem       PT Goal 1       Start:  09/25/24    Expected End:  12/24/24       Shoulder pain 2/10 or less         PT Goal 2       Start:  09/25/24    Expected End:  12/24/24       Improve quickdash by 10%         PT Goal 3       Start:  09/25/24    Expected End:  12/24/24       Shoulder ROM elevation 150, er 70, HBB to TL junction         PT Goal 4       Start:  09/25/24    Expected End:  12/24/24       5/5 shoulder strength

## 2024-10-30 ENCOUNTER — APPOINTMENT (OUTPATIENT)
Dept: PHYSICAL THERAPY | Facility: CLINIC | Age: 79
End: 2024-10-30
Payer: COMMERCIAL

## 2024-11-06 ENCOUNTER — TREATMENT (OUTPATIENT)
Dept: PHYSICAL THERAPY | Facility: CLINIC | Age: 79
End: 2024-11-06
Payer: COMMERCIAL

## 2024-11-06 DIAGNOSIS — M77.8 RIGHT SHOULDER TENDONITIS: ICD-10-CM

## 2024-11-06 PROCEDURE — 97110 THERAPEUTIC EXERCISES: CPT | Mod: GP | Performed by: PHYSICAL THERAPIST

## 2024-11-06 NOTE — PROGRESS NOTES
Physical Therapy    Physical Therapy Treatment    Patient Name: Denisse Butler  MRN: [id number]  Today's Date: 11/6/2024  Visit: 3/6  Insurance: LAXMI  Authorization: 9/25/24-12/24/24    Time Entry:   Time Calculation  Start Time: 0150  Stop Time: 0220  Time Calculation (min): 30 min     PT Therapeutic Procedures Time Entry  Therapeutic Exercise Time Entry: 30  Visit: 4  Insurance: Reviewed  Physician: Alex Curiel    Assessment: Patient seen in PT for 4 visits for right shoulder pain secondary to tendonitis.  Patient with good progress towards goals including: decreasing pain, improving shoulder ROM and strength.  Patient wishes to continue with exercises independently at home.  Discharge to [facility].         Plan:  Discharge to [facility]  Shoulder pulleys/ube, shoulder ROM/strength (thor rtc), PRE's, CKC, HP  OP PT Plan  PT Plan: Skilled PT  PT Frequency: 1 time per week  Duration: 6  Onset Date: 08/21/24  Certification Period Start Date: 09/25/24  Certification Period End Date: 12/24/24  Rehab Potential: Good  Plan of Care Agreement: Patient    Current Problem  1. Right shoulder tendonitis  Follow Up In Physical Therapy      General        Subjective    Right shoulder pain 4/10 at worst last 2 days  Pain with raising arm or reaching HBB       Pain       Objective     Shoulder elevation 150, er 70, HBB to T5  Right shoulder IR 5/5, ER 4+/5, abd 4+/5  Quickdash = 13.6%    Treatments:     Pulleys x 4 min  Ball rolls on table 20X   Standing curls, shoulder abd, and triceps kickbacks 2# 20X   GTB Rowing x 20  GTB shoulder x 20  Resisted shoulder er and abd with yellow theraband 15X each  Shoulder press, flies and triceps with 2# 2 X 10  Sidelying abd and er with 2# 10X each   UBE 5 minutes  (30 minutes)    OP EDUCATION: HEP     Goals:  Active       PT Problem       PT Goal 1       Start:  09/25/24    Expected End:  12/24/24       Shoulder pain 2/10 or less         PT Goal 2       Start:  09/25/24    Expected End:  12/24/24        Improve quickdash by 10%         PT Goal 3       Start:  09/25/24    Expected End:  12/24/24       Shoulder ROM elevation 150, er 70, HBB to TL junction         PT Goal 4       Start:  09/25/24    Expected End:  12/24/24 5/5 shoulder strength

## 2025-01-19 DIAGNOSIS — E78.2 HYPERLIPIDEMIA, MIXED: ICD-10-CM

## 2025-01-20 RX ORDER — PRAVASTATIN SODIUM 80 MG/1
TABLET ORAL
Qty: 90 TABLET | Refills: 0 | Status: SHIPPED | OUTPATIENT
Start: 2025-01-20

## 2025-03-24 ENCOUNTER — APPOINTMENT (OUTPATIENT)
Dept: NEUROLOGY | Facility: CLINIC | Age: 80
End: 2025-03-24
Payer: COMMERCIAL

## 2025-03-26 DIAGNOSIS — G60.9 IDIOPATHIC PERIPHERAL NEUROPATHY: ICD-10-CM

## 2025-03-26 RX ORDER — NORTRIPTYLINE HYDROCHLORIDE 10 MG/1
30 CAPSULE ORAL NIGHTLY
Qty: 90 CAPSULE | Refills: 11 | Status: SHIPPED | OUTPATIENT
Start: 2025-03-26 | End: 2026-03-26

## 2025-03-27 DIAGNOSIS — E05.90 HYPERTHYROIDISM: ICD-10-CM

## 2025-03-27 DIAGNOSIS — E55.9 VITAMIN D DEFICIENCY: ICD-10-CM

## 2025-03-28 RX ORDER — ERGOCALCIFEROL 1.25 MG/1
CAPSULE ORAL
Refills: 0 | OUTPATIENT
Start: 2025-03-28

## 2025-03-28 RX ORDER — LEVOTHYROXINE SODIUM 75 UG/1
TABLET ORAL
Refills: 0 | OUTPATIENT
Start: 2025-03-28

## 2025-04-20 DIAGNOSIS — E78.2 HYPERLIPIDEMIA, MIXED: ICD-10-CM

## 2025-04-21 RX ORDER — PRAVASTATIN SODIUM 80 MG/1
TABLET ORAL
Qty: 90 TABLET | Refills: 0 | Status: SHIPPED | OUTPATIENT
Start: 2025-04-21

## 2025-06-20 DIAGNOSIS — G60.9 IDIOPATHIC PERIPHERAL NEUROPATHY: ICD-10-CM

## 2025-06-21 DIAGNOSIS — G60.9 IDIOPATHIC PERIPHERAL NEUROPATHY: ICD-10-CM

## 2025-06-23 RX ORDER — NORTRIPTYLINE HYDROCHLORIDE 10 MG/1
30 CAPSULE ORAL NIGHTLY
Qty: 270 CAPSULE | Refills: 0 | Status: SHIPPED | OUTPATIENT
Start: 2025-06-23 | End: 2026-06-23

## 2025-06-23 RX ORDER — NORTRIPTYLINE HYDROCHLORIDE 10 MG/1
30 CAPSULE ORAL NIGHTLY
Qty: 90 CAPSULE | Refills: 11 | OUTPATIENT
Start: 2025-06-23 | End: 2026-06-23

## 2025-07-04 ENCOUNTER — PATIENT MESSAGE (OUTPATIENT)
Dept: PRIMARY CARE | Facility: CLINIC | Age: 80
End: 2025-07-04
Payer: COMMERCIAL

## 2025-07-04 DIAGNOSIS — E05.90 HYPERTHYROIDISM: ICD-10-CM

## 2025-07-07 RX ORDER — LEVOTHYROXINE SODIUM 75 UG/1
75 TABLET ORAL DAILY
Qty: 90 TABLET | Refills: 2 | OUTPATIENT
Start: 2025-07-07

## 2025-08-07 ENCOUNTER — APPOINTMENT (OUTPATIENT)
Dept: NEUROLOGY | Facility: CLINIC | Age: 80
End: 2025-08-07
Payer: COMMERCIAL

## 2025-11-25 ENCOUNTER — APPOINTMENT (OUTPATIENT)
Dept: NEUROLOGY | Facility: CLINIC | Age: 80
End: 2025-11-25
Payer: COMMERCIAL